# Patient Record
Sex: MALE | Race: WHITE | Employment: OTHER | URBAN - METROPOLITAN AREA
[De-identification: names, ages, dates, MRNs, and addresses within clinical notes are randomized per-mention and may not be internally consistent; named-entity substitution may affect disease eponyms.]

---

## 2017-01-04 ENCOUNTER — TELEPHONE (OUTPATIENT)
Dept: FAMILY MEDICINE CLINIC | Facility: CLINIC | Age: 72
End: 2017-01-04

## 2017-01-04 DIAGNOSIS — D49.2 SKIN GROWTH: Primary | ICD-10-CM

## 2017-01-04 RX ORDER — CODEINE PHOSPHATE AND GUAIFENESIN 10; 100 MG/5ML; MG/5ML
5 SOLUTION ORAL EVERY 6 HOURS PRN
Qty: 120 ML | Refills: 0 | OUTPATIENT
Start: 2017-01-04 | End: 2017-01-14

## 2017-01-04 NOTE — TELEPHONE ENCOUNTER
On 12/28/2015 he was given CHERATUSSIN AC. Was ill and took what he had left and it helps him feel better but he has run out and would like this refilled once to help clear up his cough.  Also wants referral to Dermatologist

## 2017-01-04 NOTE — TELEPHONE ENCOUNTER
Patient would like to know if he can get a refill on a cough syrup he was given last year.  Patient also stated he has a growth on arm would like to know if Dr. Angel Teran has any recommendations on a dermatologist.

## 2017-01-04 NOTE — TELEPHONE ENCOUNTER
[facility] [address] WHJYFUNETIONA [phone]   Called patient [facility] to call back

## 2017-01-09 RX ORDER — LISINOPRIL 20 MG/1
TABLET ORAL
Qty: 90 TABLET | Refills: 0 | Status: SHIPPED | OUTPATIENT
Start: 2017-01-09 | End: 2017-05-01

## 2017-01-09 NOTE — TELEPHONE ENCOUNTER
Incoming request for Lisinopril 20 mg. LOV 9/2/2016 and last labs 7/28/2016.  Future Appointments  Date Time Provider Avis Capri   1/9/2017 1:15 PM Antonio Melgar MD [facility]   4/12/2017 9:30 AM Yobani Jacome MD [address] [place] [address]

## 2017-02-06 ENCOUNTER — OFFICE VISIT (OUTPATIENT)
Dept: FAMILY MEDICINE CLINIC | Facility: CLINIC | Age: 72
End: 2017-02-06

## 2017-02-06 VITALS
SYSTOLIC BLOOD PRESSURE: 134 MMHG | TEMPERATURE: 98 F | WEIGHT: 173 LBS | HEART RATE: 68 BPM | RESPIRATION RATE: 16 BRPM | BODY MASS INDEX: 27 KG/M2 | DIASTOLIC BLOOD PRESSURE: 82 MMHG

## 2017-02-06 DIAGNOSIS — M65.4 DE QUERVAIN'S TENOSYNOVITIS, RIGHT: Primary | ICD-10-CM

## 2017-02-06 PROCEDURE — 99213 OFFICE O/P EST LOW 20 MIN: CPT | Performed by: FAMILY MEDICINE

## 2017-02-06 NOTE — PROGRESS NOTES
Patient presents with:  Wrist Pain: x3 months w/ right wrist pain and swelling. Pt states its getting worse now can't take cap of a bottle. Pain level 6-7/10.      HPI:   Wilver Stein is a 80year old male who presents to the office for R wrist and forear

## 2017-03-06 ENCOUNTER — TELEPHONE (OUTPATIENT)
Dept: FAMILY MEDICINE CLINIC | Facility: CLINIC | Age: 72
End: 2017-03-06

## 2017-03-06 NOTE — TELEPHONE ENCOUNTER
Reviewed Dr Ascencion Rodgers directives with pt and he verbalized understanding. He will take meds as usual today.

## 2017-03-06 NOTE — TELEPHONE ENCOUNTER
Pt states that he accidentally took his Lisinopril, Crestor and Plavix twice yesterday. He is feeling tired today. I asked pt to take his BP, but he said that he was too busy to take his BP. Pt questions if he should take his meds today or skip today? Ro

## 2017-03-06 NOTE — TELEPHONE ENCOUNTER
Pt states that yesterday he double dosed on 3 of his meds (Lisiniprol,Crestor and Plavix) Does he take meds today or skip? ?? Please advise

## 2017-03-06 NOTE — TELEPHONE ENCOUNTER
I would still take them as usual.   After exertion, its normal for BP to go up, so that does not surprise me.

## 2017-03-06 NOTE — TELEPHONE ENCOUNTER
Pt reports that BP was 154/55 after climbing the steps this morning. Five minutes later it was 122/64 and five minutes after that it was 116/64.

## 2017-03-29 PROBLEM — H25.13 NUCLEAR SCLEROTIC CATARACT OF BOTH EYES: Status: ACTIVE | Noted: 2017-03-29

## 2017-03-29 PROBLEM — H52.13 MYOPIA OF BOTH EYES: Status: ACTIVE | Noted: 2017-03-29

## 2017-04-10 ENCOUNTER — OFFICE VISIT (OUTPATIENT)
Dept: FAMILY MEDICINE CLINIC | Facility: CLINIC | Age: 72
End: 2017-04-10

## 2017-04-10 VITALS
HEART RATE: 68 BPM | TEMPERATURE: 98 F | SYSTOLIC BLOOD PRESSURE: 132 MMHG | DIASTOLIC BLOOD PRESSURE: 70 MMHG | WEIGHT: 174 LBS | RESPIRATION RATE: 12 BRPM | BODY MASS INDEX: 27 KG/M2

## 2017-04-10 DIAGNOSIS — J44.1 COPD EXACERBATION (HCC): Primary | ICD-10-CM

## 2017-04-10 PROCEDURE — 99213 OFFICE O/P EST LOW 20 MIN: CPT | Performed by: FAMILY MEDICINE

## 2017-04-10 RX ORDER — AZITHROMYCIN 250 MG/1
TABLET, FILM COATED ORAL
Qty: 6 TABLET | Refills: 0 | Status: SHIPPED | OUTPATIENT
Start: 2017-04-10 | End: 2017-04-21

## 2017-04-10 RX ORDER — CODEINE PHOSPHATE AND GUAIFENESIN 10; 100 MG/5ML; MG/5ML
5 SOLUTION ORAL EVERY 6 HOURS PRN
Qty: 120 ML | Refills: 0 | Status: SHIPPED | OUTPATIENT
Start: 2017-04-10 | End: 2017-04-20

## 2017-04-10 RX ORDER — PREDNISONE 20 MG/1
40 TABLET ORAL DAILY
Qty: 10 TABLET | Refills: 0 | Status: SHIPPED | OUTPATIENT
Start: 2017-04-10 | End: 2017-04-15

## 2017-04-10 NOTE — PROGRESS NOTES
Patient presents with:  Cough: x1 week w/ productive cough and sinus congestion. HPI:   Stephania Stokes is a 80year old male with PMH COPD who presents to the office for URI, cough. Niece got  on [place] cruise.   Several flights and cruises, erythematous  Neck - supple, no significant adenopathy  Chest - tight, wheezing heard throughout. Not air hungry    ASSESSMENT AND PLAN:     Anton Noris was seen in the office today:  had concerns including Cough.     1. COPD exacerbation ([address]  Rest  C

## 2017-04-21 ENCOUNTER — OFFICE VISIT (OUTPATIENT)
Dept: FAMILY MEDICINE CLINIC | Facility: CLINIC | Age: 72
End: 2017-04-21

## 2017-04-21 VITALS
HEART RATE: 64 BPM | RESPIRATION RATE: 14 BRPM | WEIGHT: 172 LBS | BODY MASS INDEX: 27 KG/M2 | TEMPERATURE: 98 F | SYSTOLIC BLOOD PRESSURE: 120 MMHG | DIASTOLIC BLOOD PRESSURE: 60 MMHG

## 2017-04-21 DIAGNOSIS — Z12.5 PROSTATE CANCER SCREENING: ICD-10-CM

## 2017-04-21 DIAGNOSIS — I10 ESSENTIAL HYPERTENSION, BENIGN: ICD-10-CM

## 2017-04-21 DIAGNOSIS — I73.9 PAD (PERIPHERAL ARTERY DISEASE) (HCC): ICD-10-CM

## 2017-04-21 DIAGNOSIS — E78.00 PURE HYPERCHOLESTEROLEMIA: ICD-10-CM

## 2017-04-21 DIAGNOSIS — E11.22 TYPE 2 DIABETES MELLITUS WITH STAGE 3 CHRONIC KIDNEY DISEASE, WITHOUT LONG-TERM CURRENT USE OF INSULIN (HCC): Primary | ICD-10-CM

## 2017-04-21 DIAGNOSIS — E11.29 MICROALBUMINURIA DUE TO TYPE 2 DIABETES MELLITUS (HCC): ICD-10-CM

## 2017-04-21 DIAGNOSIS — N18.30 TYPE 2 DIABETES MELLITUS WITH STAGE 3 CHRONIC KIDNEY DISEASE, WITHOUT LONG-TERM CURRENT USE OF INSULIN (HCC): Primary | ICD-10-CM

## 2017-04-21 DIAGNOSIS — J44.9 CHRONIC OBSTRUCTIVE PULMONARY DISEASE, UNSPECIFIED COPD TYPE (HCC): ICD-10-CM

## 2017-04-21 DIAGNOSIS — R80.9 MICROALBUMINURIA DUE TO TYPE 2 DIABETES MELLITUS (HCC): ICD-10-CM

## 2017-04-21 PROBLEM — H52.13 MYOPIA OF BOTH EYES: Status: RESOLVED | Noted: 2017-03-29 | Resolved: 2017-04-21

## 2017-04-21 PROCEDURE — 99214 OFFICE O/P EST MOD 30 MIN: CPT | Performed by: FAMILY MEDICINE

## 2017-04-21 RX ORDER — AZITHROMYCIN 250 MG/1
TABLET, FILM COATED ORAL
COMMUNITY
Start: 2017-04-10 | End: 2017-04-21 | Stop reason: ALTCHOICE

## 2017-04-21 RX ORDER — AZELASTINE 1 MG/ML
1 SPRAY, METERED NASAL 2 TIMES DAILY
Qty: 1 BOTTLE | Refills: 2 | Status: SHIPPED | OUTPATIENT
Start: 2017-04-21 | End: 2017-07-11

## 2017-04-21 RX ORDER — CODEINE PHOSPHATE AND GUAIFENESIN 10; 100 MG/5ML; MG/5ML
5 SOLUTION ORAL EVERY 6 HOURS PRN
Qty: 120 ML | Refills: 0 | Status: SHIPPED | OUTPATIENT
Start: 2017-04-21 | End: 2017-05-01

## 2017-04-21 RX ORDER — PREDNISONE 20 MG/1
TABLET ORAL
COMMUNITY
Start: 2017-04-10 | End: 2017-04-21 | Stop reason: ALTCHOICE

## 2017-04-21 NOTE — PROGRESS NOTES
Patient presents with:  Cough: follow up- finished Prednisone and Z-Ramiro last Saturday  Shortness Of Breath: follow up- still having SOB  Headache: still present but better     HPI:   Anton Hamm is a 80year old male with PMH DM2, HLD, HTN, COPD who pre week         Comment: 1 drink a month       REVIEW OF SYSTEMS:     Constitutional: positive for fatigue  Eyes: negative  Ears, nose, mouth, throat, and face: positive for nasal congestion  Respiratory: positive for cough  Cardiovascular: negative  Zondra Rathke HDL CHOL      <130 mg/dL 113   MALB URINE       1.27   CREATININE UR RANDOM       34.70   MALB/CRE CALC      <=30.0 ug/mg 36.6 (H)   HEMOGLOBIN A1c      <5.7 % 7.1 (H)   ESTIMATED AVERAGE GLUCOSE       mg/dL 157 (H)   PSA      0.010-4.000 ng/mL 1.330

## 2017-04-24 ENCOUNTER — HOSPITAL ENCOUNTER (OUTPATIENT)
Dept: GENERAL RADIOLOGY | Age: 72
Discharge: HOME | End: 2017-04-24
Attending: FAMILY MEDICINE
Payer: COMMERCIAL

## 2017-04-24 DIAGNOSIS — J44.9 CHRONIC OBSTRUCTIVE PULMONARY DISEASE, UNSPECIFIED COPD TYPE (HCC): ICD-10-CM

## 2017-04-24 PROCEDURE — 71020 XR CHEST PA + LAT CHEST (CPT=71020): CPT

## 2017-05-01 DIAGNOSIS — I10 ESSENTIAL HYPERTENSION, BENIGN: Primary | ICD-10-CM

## 2017-05-03 RX ORDER — LISINOPRIL 20 MG/1
TABLET ORAL
Qty: 90 TABLET | Refills: 1 | Status: SHIPPED | OUTPATIENT
Start: 2017-05-03 | End: 2017-05-15

## 2017-05-03 NOTE — TELEPHONE ENCOUNTER
Medication refilled per protocol.     LOV 4/21/2017  Future Appointments  Date Time Provider Avis Farooq   6/27/2017 10:10 AM Grace Grayson MD [facility]       Pending Prescriptions Disp Refills    LISINOPRIL 20 MG Oral Tab [facility] Med Name

## 2017-05-15 ENCOUNTER — TELEPHONE (OUTPATIENT)
Dept: FAMILY MEDICINE CLINIC | Facility: CLINIC | Age: 72
End: 2017-05-15

## 2017-05-15 RX ORDER — ENALAPRIL MALEATE 10 MG/1
10 TABLET ORAL DAILY
Qty: 30 TABLET | Refills: 5 | Status: SHIPPED | OUTPATIENT
Start: 2017-05-15 | End: 2017-07-31

## 2017-05-15 NOTE — TELEPHONE ENCOUNTER
With Mr. Gomez's allergies to several other meds, I would be hesitant to change a med that is not causing any issues otherwise like this. Perhaps he can look for the same medication at a different pharmacy?   Sometimes different groups contract to get thei

## 2017-05-15 NOTE — TELEPHONE ENCOUNTER
patient refuses to take lisinopril as it is all made in [facility] now he wants a different medication then he will do the research to see if it is safe   ACE inhibitors  Generic name Common brand names benazepril hydrochloride Lotensin* captopril Capoten* enal

## 2017-05-15 NOTE — TELEPHONE ENCOUNTER
Patient is requesting a new medication to take place of Lisinopril as all the lisinopril that the pharmacy wants to give him is made in [facility] and he absolutely refuses to take any [facility] medication.  He would like the name of something new so he can researc

## 2017-05-15 NOTE — TELEPHONE ENCOUNTER
Spoke with pt and he stated that he will do some research on Enalapril before he takes it. I did inform pt that Dr. Patten Getting sent a prescription over to his pharmacy.  Pt states that he will call back to let our office know if he decides to pick the prescript

## 2017-05-15 NOTE — TELEPHONE ENCOUNTER
Will switch to enalapril  Start with 10mg.   Can increase to 20  Given monthly supply initially to ensure he tolerates it/

## 2017-06-04 RX ORDER — TAMSULOSIN HYDROCHLORIDE 0.4 MG/1
CAPSULE ORAL
Qty: 90 CAPSULE | Refills: 1 | Status: SHIPPED | OUTPATIENT
Start: 2017-06-04 | End: 2017-10-28

## 2017-07-07 ENCOUNTER — PRIOR ORIGINAL RECORDS (OUTPATIENT)
Dept: OTHER | Age: 72
End: 2017-07-07

## 2017-07-17 NOTE — IMAGING NOTE
Pre procedure instruction given. Pt to be here in the [facility] at [place] [address] for lab. NPO for 6 hrs, will have a  to drive back home after the procedure. Plavix held for 7 days and Takes half a tab of the 325 Asprin.  Last dose was 7/16/17 per his

## 2017-07-18 ENCOUNTER — APPOINTMENT (OUTPATIENT)
Dept: LAB | Facility: HOSPITAL | Age: 72
End: 2017-07-18
Attending: INTERNAL MEDICINE
Payer: COMMERCIAL

## 2017-07-18 ENCOUNTER — HOSPITAL ENCOUNTER (OUTPATIENT)
Dept: CT IMAGING | Facility: HOSPITAL | Age: 72
Discharge: HOME | End: 2017-07-18
Attending: INTERNAL MEDICINE
Payer: COMMERCIAL

## 2017-07-18 ENCOUNTER — HOSPITAL ENCOUNTER (OUTPATIENT)
Dept: GENERAL RADIOLOGY | Facility: HOSPITAL | Age: 72
Discharge: HOME | End: 2017-07-18
Attending: RADIOLOGY
Payer: COMMERCIAL

## 2017-07-18 VITALS
HEIGHT: 67 IN | BODY MASS INDEX: 26.68 KG/M2 | WEIGHT: 170 LBS | OXYGEN SATURATION: 98 % | DIASTOLIC BLOOD PRESSURE: 65 MMHG | RESPIRATION RATE: 18 BRPM | SYSTOLIC BLOOD PRESSURE: 150 MMHG | HEART RATE: 48 BPM | TEMPERATURE: 98 F

## 2017-07-18 DIAGNOSIS — R91.8 LUNG MASS: ICD-10-CM

## 2017-07-18 DIAGNOSIS — R91.8 LUNG MASS: Primary | ICD-10-CM

## 2017-07-18 LAB
ERYTHROCYTE [DISTWIDTH] IN BLOOD BY AUTOMATED COUNT: 13.1 % (ref 11.5–16)
GLUCOSE BLD-MCNC: 140 MG/DL (ref 65–99)
HCT VFR BLD AUTO: 37.2 % (ref 37–53)
HGB BLD-MCNC: 12.5 G/DL (ref 13–17)
INR BLD: 1.02 (ref 0.89–1.11)
MCH RBC QN AUTO: 32.5 PG (ref 27–33.2)
MCHC RBC AUTO-ENTMCNC: 33.6 G/DL (ref 31–37)
MCV RBC AUTO: 96.6 FL (ref 80–99)
PLATELET # BLD AUTO: 237 10(3)UL (ref 150–450)
PSA SERPL DL<=0.01 NG/ML-MCNC: 13.4 SECONDS (ref 12–14.3)
RBC # BLD AUTO: 3.85 X10(6)UL (ref 3.8–5.8)
RED CELL DISTRIBUTION WIDTH-SD: 46.2 FL (ref 35.1–46.3)
WBC # BLD AUTO: 6.9 X10(3) UL (ref 4–13)

## 2017-07-18 PROCEDURE — 85610 PROTHROMBIN TIME: CPT

## 2017-07-18 PROCEDURE — 82962 GLUCOSE BLOOD TEST: CPT

## 2017-07-18 PROCEDURE — 77012 CT SCAN FOR NEEDLE BIOPSY: CPT | Performed by: INTERNAL MEDICINE

## 2017-07-18 PROCEDURE — 32405 CT BIOPSY LUNG OR MEDIASTINUM (CPT=77012/32405): CPT | Performed by: INTERNAL MEDICINE

## 2017-07-18 PROCEDURE — 99152 MOD SED SAME PHYS/QHP 5/>YRS: CPT | Performed by: INTERNAL MEDICINE

## 2017-07-18 PROCEDURE — 88305 TISSUE EXAM BY PATHOLOGIST: CPT | Performed by: INTERNAL MEDICINE

## 2017-07-18 PROCEDURE — 71010 XR CHEST AP PORTABLE  (CPT=71010): CPT | Performed by: RADIOLOGY

## 2017-07-18 PROCEDURE — 85027 COMPLETE CBC AUTOMATED: CPT | Performed by: RADIOLOGY

## 2017-07-18 PROCEDURE — 36415 COLL VENOUS BLD VENIPUNCTURE: CPT

## 2017-07-18 RX ORDER — NALOXONE HYDROCHLORIDE 0.4 MG/ML
80 INJECTION, SOLUTION INTRAMUSCULAR; INTRAVENOUS; SUBCUTANEOUS AS NEEDED
Status: DISCONTINUED | OUTPATIENT
Start: 2017-07-18 | End: 2017-07-22

## 2017-07-18 RX ORDER — MIDAZOLAM HYDROCHLORIDE 1 MG/ML
1 INJECTION INTRAMUSCULAR; INTRAVENOUS EVERY 5 MIN PRN
Status: ACTIVE | OUTPATIENT
Start: 2017-07-18 | End: 2017-07-18

## 2017-07-18 RX ORDER — MIDAZOLAM HYDROCHLORIDE 1 MG/ML
INJECTION INTRAMUSCULAR; INTRAVENOUS
Status: COMPLETED
Start: 2017-07-18 | End: 2017-07-18

## 2017-07-18 RX ORDER — FLUMAZENIL 0.1 MG/ML
0.2 INJECTION, SOLUTION INTRAVENOUS AS NEEDED
Status: DISCONTINUED | OUTPATIENT
Start: 2017-07-18 | End: 2017-07-22

## 2017-07-18 RX ORDER — SODIUM CHLORIDE 9 MG/ML
INJECTION, SOLUTION INTRAVENOUS CONTINUOUS
Status: DISCONTINUED | OUTPATIENT
Start: 2017-07-18 | End: 2017-07-22

## 2017-07-18 RX ORDER — MIDAZOLAM HYDROCHLORIDE 1 MG/ML
INJECTION INTRAMUSCULAR; INTRAVENOUS
Status: DISCONTINUED
Start: 2017-07-18 | End: 2017-07-18 | Stop reason: WASHOUT

## 2017-07-18 RX ADMIN — MIDAZOLAM HYDROCHLORIDE 1 MG: 1 INJECTION INTRAMUSCULAR; INTRAVENOUS at 09:18:00

## 2017-07-18 RX ADMIN — MIDAZOLAM HYDROCHLORIDE 1 MG: 1 INJECTION INTRAMUSCULAR; INTRAVENOUS at 09:23:00

## 2017-07-18 RX ADMIN — SODIUM CHLORIDE: 9 INJECTION, SOLUTION INTRAVENOUS at 09:15:00

## 2017-07-18 NOTE — IMAGING NOTE
Pt tolerated procedure well, vss on RA, presently denies pain and dressing is CDI. Pt and family updated on procedure and plan of care, report called to [facility] RN and awaiting transport to room#47 with family at bedside.   Pt may resume Plavix and ASA

## 2017-07-24 ENCOUNTER — PATIENT OUTREACH (OUTPATIENT)
Dept: CASE MANAGEMENT | Age: 72
End: 2017-07-24

## 2017-07-28 ENCOUNTER — OFFICE VISIT (OUTPATIENT)
Dept: FAMILY MEDICINE CLINIC | Facility: CLINIC | Age: 72
End: 2017-07-28

## 2017-07-28 ENCOUNTER — APPOINTMENT (OUTPATIENT)
Dept: LAB | Age: 72
End: 2017-07-28
Attending: FAMILY MEDICINE
Payer: COMMERCIAL

## 2017-07-28 VITALS
WEIGHT: 169 LBS | DIASTOLIC BLOOD PRESSURE: 70 MMHG | TEMPERATURE: 98 F | SYSTOLIC BLOOD PRESSURE: 126 MMHG | HEIGHT: 66 IN | BODY MASS INDEX: 27.16 KG/M2 | HEART RATE: 84 BPM

## 2017-07-28 DIAGNOSIS — I10 ESSENTIAL HYPERTENSION, BENIGN: ICD-10-CM

## 2017-07-28 DIAGNOSIS — E11.29 MICROALBUMINURIA DUE TO TYPE 2 DIABETES MELLITUS (HCC): ICD-10-CM

## 2017-07-28 DIAGNOSIS — Z00.00 ANNUAL PHYSICAL EXAM: Primary | ICD-10-CM

## 2017-07-28 DIAGNOSIS — R80.9 MICROALBUMINURIA DUE TO TYPE 2 DIABETES MELLITUS (HCC): ICD-10-CM

## 2017-07-28 DIAGNOSIS — N18.30 TYPE 2 DIABETES MELLITUS WITH STAGE 3 CHRONIC KIDNEY DISEASE, WITHOUT LONG-TERM CURRENT USE OF INSULIN (HCC): ICD-10-CM

## 2017-07-28 DIAGNOSIS — E78.00 PURE HYPERCHOLESTEROLEMIA: ICD-10-CM

## 2017-07-28 DIAGNOSIS — Z12.5 PROSTATE CANCER SCREENING: ICD-10-CM

## 2017-07-28 DIAGNOSIS — E11.22 TYPE 2 DIABETES MELLITUS WITH STAGE 3 CHRONIC KIDNEY DISEASE, WITHOUT LONG-TERM CURRENT USE OF INSULIN (HCC): ICD-10-CM

## 2017-07-28 DIAGNOSIS — J44.9 CHRONIC OBSTRUCTIVE PULMONARY DISEASE, UNSPECIFIED COPD TYPE (HCC): ICD-10-CM

## 2017-07-28 DIAGNOSIS — I73.9 PAD (PERIPHERAL ARTERY DISEASE) (HCC): ICD-10-CM

## 2017-07-28 LAB
ALBUMIN SERPL-MCNC: 3.7 G/DL (ref 3.5–4.8)
ALP LIVER SERPL-CCNC: 84 U/L (ref 45–117)
ALT SERPL-CCNC: 32 U/L (ref 17–63)
AST SERPL-CCNC: 19 U/L (ref 15–41)
BILIRUB SERPL-MCNC: 0.4 MG/DL (ref 0.1–2)
BUN BLD-MCNC: 27 MG/DL (ref 8–20)
CALCIUM BLD-MCNC: 8.7 MG/DL (ref 8.3–10.3)
CHLORIDE: 108 MMOL/L (ref 101–111)
CHOLEST SMN-MCNC: 140 MG/DL (ref ?–200)
CO2: 22 MMOL/L (ref 22–32)
COMPLEXED PSA SERPL-MCNC: 1.31 NG/ML (ref 0.01–4)
CREAT BLD-MCNC: 1.27 MG/DL (ref 0.7–1.3)
CREAT UR-SCNC: 26 MG/DL
EST. AVERAGE GLUCOSE BLD GHB EST-MCNC: 143 MG/DL (ref 68–126)
GLUCOSE BLD-MCNC: 138 MG/DL (ref 70–99)
HBA1C MFR BLD HPLC: 6.6 % (ref ?–5.7)
HDLC SERPL-MCNC: 50 MG/DL (ref 45–?)
HDLC SERPL: 2.8 {RATIO} (ref ?–4.97)
LDLC SERPL CALC-MCNC: 77 MG/DL (ref ?–130)
LDLC SERPL-MCNC: 13 MG/DL (ref 5–40)
M PROTEIN MFR SERPL ELPH: 7.2 G/DL (ref 6.1–8.3)
MICROALBUMIN UR-MCNC: 3.66 MG/DL
MICROALBUMIN/CREAT 24H UR-RTO: 140.8 UG/MG (ref ?–30)
NONHDLC SERPL-MCNC: 90 MG/DL (ref ?–130)
POTASSIUM SERPL-SCNC: 5 MMOL/L (ref 3.6–5.1)
SODIUM SERPL-SCNC: 138 MMOL/L (ref 136–144)
TRIGLYCERIDES: 64 MG/DL (ref ?–150)

## 2017-07-28 PROCEDURE — 99397 PER PM REEVAL EST PAT 65+ YR: CPT | Performed by: FAMILY MEDICINE

## 2017-07-28 PROCEDURE — 83036 HEMOGLOBIN GLYCOSYLATED A1C: CPT

## 2017-07-28 PROCEDURE — 82570 ASSAY OF URINE CREATININE: CPT

## 2017-07-28 PROCEDURE — 80061 LIPID PANEL: CPT

## 2017-07-28 PROCEDURE — 36415 COLL VENOUS BLD VENIPUNCTURE: CPT

## 2017-07-28 PROCEDURE — 82043 UR ALBUMIN QUANTITATIVE: CPT

## 2017-07-28 PROCEDURE — 80053 COMPREHEN METABOLIC PANEL: CPT

## 2017-07-28 NOTE — PROGRESS NOTES
Patient presents with:  Physical: annual physical, labs drawn     HPI:   Doug Soliz is a 80year old male who presents for a complete physical exam.     Last colonoscopy:  Dr. Marcia Whiteside. No repeat needed until 80yo  Done 2016.    Last PSA:  Last normal in Acids (FISH OIL OR) Take by mouth. Disp:  Rfl:    Glucosamine-Chondroit-Vit C-Mn (GLUCOSAMINE 1500 COMPLEX OR) Take by mouth. Disp:  Rfl:    Ergocalciferol (VITAMIN D OR) Take by mouth. Disp:  Rfl:    Coenzyme Q10 (CO Q 10 OR) Take by mouth.  Disp:  Rfl: Past Surgical History:  No date: CAROTID ENDARTERECTOMY Left  2015: CAROTID ENDARTERECTOMY  No date: CATH BARE METAL STENT (BMS)      Comment: left *carotid* stent 02/16 2006: COLONOSCOPY  10/2016: COLONOSCOPY      Comment: pt no longer needs to get col regular rate and rhythm  Abdomen: soft, non-tender; bowel sounds normal; no masses,  no organomegaly  Extremities: Bilateral barefoot skin diabetic exam is normal, visualized feet and the appearance is normal.  Bilateral monofilament/sensation of both feet

## 2017-07-31 DIAGNOSIS — I10 ESSENTIAL HYPERTENSION, BENIGN: Primary | ICD-10-CM

## 2017-08-01 DIAGNOSIS — E78.00 PURE HYPERCHOLESTEROLEMIA: ICD-10-CM

## 2017-08-01 RX ORDER — ENALAPRIL MALEATE 10 MG/1
10 TABLET ORAL DAILY
Qty: 30 TABLET | Refills: 5 | Status: SHIPPED | OUTPATIENT
Start: 2017-08-01 | End: 2018-03-07

## 2017-08-02 RX ORDER — ROSUVASTATIN CALCIUM 20 MG/1
20 TABLET, COATED ORAL NIGHTLY
Qty: 90 TABLET | Refills: 1 | Status: SHIPPED | OUTPATIENT
Start: 2017-08-02 | End: 2018-06-04

## 2017-08-02 NOTE — TELEPHONE ENCOUNTER
Gerald Zarco is requesting a refill of Rosuvastatin 20mg Si tablet nightly. LOV for physical 17. Last labs were done at that visit.   Medication #90 with 1 additional refill given per protocol and 's order sent to [facility].

## 2017-10-30 RX ORDER — TAMSULOSIN HYDROCHLORIDE 0.4 MG/1
CAPSULE ORAL
Qty: 90 CAPSULE | Refills: 1 | Status: SHIPPED | OUTPATIENT
Start: 2017-10-30 | End: 2018-06-04

## 2017-11-27 ENCOUNTER — OFFICE VISIT (OUTPATIENT)
Dept: FAMILY MEDICINE CLINIC | Facility: CLINIC | Age: 72
End: 2017-11-27

## 2017-11-27 VITALS
HEART RATE: 72 BPM | WEIGHT: 170 LBS | RESPIRATION RATE: 16 BRPM | TEMPERATURE: 102 F | DIASTOLIC BLOOD PRESSURE: 70 MMHG | BODY MASS INDEX: 27 KG/M2 | SYSTOLIC BLOOD PRESSURE: 120 MMHG

## 2017-11-27 DIAGNOSIS — J01.10 ACUTE NON-RECURRENT FRONTAL SINUSITIS: Primary | ICD-10-CM

## 2017-11-27 PROCEDURE — 99213 OFFICE O/P EST LOW 20 MIN: CPT | Performed by: FAMILY MEDICINE

## 2017-11-27 RX ORDER — LEVOFLOXACIN 750 MG/1
750 TABLET ORAL DAILY
Qty: 7 TABLET | Refills: 0 | Status: SHIPPED | OUTPATIENT
Start: 2017-11-27 | End: 2017-12-04

## 2017-11-27 RX ORDER — CODEINE PHOSPHATE AND GUAIFENESIN 10; 100 MG/5ML; MG/5ML
5 SOLUTION ORAL EVERY 6 HOURS PRN
Qty: 240 ML | Refills: 0 | Status: SHIPPED | OUTPATIENT
Start: 2017-11-27 | End: 2017-12-07

## 2017-11-27 NOTE — PROGRESS NOTES
Patient presents with:  Cold: getting worse, coughing, bringing up green mucous     HPI:   Anton Hamm is a 71year old male who presents to the office for cough, fever. Fever 102 today. Symptoms since Tuesday (6 days). Feeling a lot of fatigue.   Has

## 2018-01-01 ENCOUNTER — OFFICE VISIT (OUTPATIENT)
Dept: HEMATOLOGY/ONCOLOGY | Facility: HOSPITAL | Age: 73
End: 2018-01-01
Attending: INTERNAL MEDICINE
Payer: COMMERCIAL

## 2018-01-01 ENCOUNTER — HOSPITAL ENCOUNTER (OUTPATIENT)
Dept: RADIATION ONCOLOGY | Facility: HOSPITAL | Age: 73
Discharge: HOME | End: 2018-01-01
Attending: RADIOLOGY
Payer: COMMERCIAL

## 2018-01-01 ENCOUNTER — TELEPHONE (OUTPATIENT)
Dept: HEMATOLOGY/ONCOLOGY | Facility: HOSPITAL | Age: 73
End: 2018-01-01

## 2018-01-01 ENCOUNTER — OFFICE VISIT (OUTPATIENT)
Dept: FAMILY MEDICINE CLINIC | Facility: CLINIC | Age: 73
End: 2018-01-01
Payer: COMMERCIAL

## 2018-01-01 ENCOUNTER — APPOINTMENT (OUTPATIENT)
Dept: RADIATION ONCOLOGY | Age: 73
End: 2018-01-01
Attending: RADIOLOGY
Payer: COMMERCIAL

## 2018-01-01 ENCOUNTER — HOSPITAL ENCOUNTER (OUTPATIENT)
Dept: ULTRASOUND IMAGING | Age: 73
Discharge: HOME | End: 2018-01-01
Attending: CLINICAL NURSE SPECIALIST
Payer: COMMERCIAL

## 2018-01-01 ENCOUNTER — HOSPITAL ENCOUNTER (OUTPATIENT)
Dept: RADIATION ONCOLOGY | Facility: HOSPITAL | Age: 73
End: 2018-01-01
Attending: RADIOLOGY
Payer: COMMERCIAL

## 2018-01-01 ENCOUNTER — TELEPHONE (OUTPATIENT)
Dept: FAMILY MEDICINE CLINIC | Facility: CLINIC | Age: 73
End: 2018-01-01

## 2018-01-01 ENCOUNTER — HOSPITAL ENCOUNTER (OUTPATIENT)
Dept: NUCLEAR MEDICINE | Facility: HOSPITAL | Age: 73
Discharge: HOME | End: 2018-01-01
Attending: INTERNAL MEDICINE
Payer: COMMERCIAL

## 2018-01-01 ENCOUNTER — HOSPITAL ENCOUNTER (OUTPATIENT)
Dept: RADIATION ONCOLOGY | Age: 73
Discharge: HOME | End: 2018-01-01
Attending: RADIOLOGY
Payer: COMMERCIAL

## 2018-01-01 ENCOUNTER — HOSPITAL ENCOUNTER (OUTPATIENT)
Dept: ULTRASOUND IMAGING | Age: 73
Discharge: HOME | End: 2018-01-01
Attending: INTERNAL MEDICINE
Payer: COMMERCIAL

## 2018-01-01 ENCOUNTER — HOSPITAL ENCOUNTER (OUTPATIENT)
Dept: ULTRASOUND IMAGING | Facility: HOSPITAL | Age: 73
Discharge: HOME | End: 2018-01-01
Attending: INTERNAL MEDICINE
Payer: COMMERCIAL

## 2018-01-01 VITALS
BODY MASS INDEX: 26.6 KG/M2 | SYSTOLIC BLOOD PRESSURE: 114 MMHG | HEIGHT: 67.01 IN | OXYGEN SATURATION: 96 % | DIASTOLIC BLOOD PRESSURE: 67 MMHG | WEIGHT: 169.5 LBS | TEMPERATURE: 99 F | RESPIRATION RATE: 18 BRPM | HEART RATE: 106 BPM

## 2018-01-01 VITALS
OXYGEN SATURATION: 97 % | DIASTOLIC BLOOD PRESSURE: 76 MMHG | SYSTOLIC BLOOD PRESSURE: 165 MMHG | TEMPERATURE: 99 F | HEART RATE: 100 BPM | RESPIRATION RATE: 18 BRPM

## 2018-01-01 VITALS
HEIGHT: 67.01 IN | OXYGEN SATURATION: 96 % | DIASTOLIC BLOOD PRESSURE: 67 MMHG | SYSTOLIC BLOOD PRESSURE: 121 MMHG | HEART RATE: 109 BPM | WEIGHT: 171.5 LBS | RESPIRATION RATE: 18 BRPM | BODY MASS INDEX: 26.92 KG/M2 | TEMPERATURE: 99 F

## 2018-01-01 VITALS
BODY MASS INDEX: 27.55 KG/M2 | DIASTOLIC BLOOD PRESSURE: 78 MMHG | HEIGHT: 67.01 IN | WEIGHT: 175.5 LBS | HEART RATE: 81 BPM | OXYGEN SATURATION: 95 % | TEMPERATURE: 99 F | SYSTOLIC BLOOD PRESSURE: 149 MMHG | RESPIRATION RATE: 16 BRPM

## 2018-01-01 VITALS
RESPIRATION RATE: 20 BRPM | WEIGHT: 178.19 LBS | TEMPERATURE: 99 F | HEART RATE: 76 BPM | OXYGEN SATURATION: 98 % | BODY MASS INDEX: 28 KG/M2 | DIASTOLIC BLOOD PRESSURE: 90 MMHG | SYSTOLIC BLOOD PRESSURE: 190 MMHG

## 2018-01-01 VITALS
HEIGHT: 67.01 IN | RESPIRATION RATE: 20 BRPM | TEMPERATURE: 98 F | OXYGEN SATURATION: 97 % | HEART RATE: 74 BPM | WEIGHT: 168.5 LBS | BODY MASS INDEX: 26.45 KG/M2 | SYSTOLIC BLOOD PRESSURE: 136 MMHG | DIASTOLIC BLOOD PRESSURE: 73 MMHG

## 2018-01-01 VITALS
TEMPERATURE: 99 F | RESPIRATION RATE: 20 BRPM | HEART RATE: 91 BPM | WEIGHT: 169 LBS | BODY MASS INDEX: 26.53 KG/M2 | OXYGEN SATURATION: 96 % | DIASTOLIC BLOOD PRESSURE: 74 MMHG | SYSTOLIC BLOOD PRESSURE: 128 MMHG | HEIGHT: 67.01 IN

## 2018-01-01 VITALS
BODY MASS INDEX: 26.37 KG/M2 | SYSTOLIC BLOOD PRESSURE: 110 MMHG | TEMPERATURE: 99 F | HEIGHT: 67 IN | WEIGHT: 168 LBS | HEART RATE: 72 BPM | RESPIRATION RATE: 16 BRPM | DIASTOLIC BLOOD PRESSURE: 70 MMHG

## 2018-01-01 VITALS
WEIGHT: 170 LBS | RESPIRATION RATE: 18 BRPM | BODY MASS INDEX: 27 KG/M2 | SYSTOLIC BLOOD PRESSURE: 121 MMHG | DIASTOLIC BLOOD PRESSURE: 71 MMHG | OXYGEN SATURATION: 98 % | HEART RATE: 85 BPM

## 2018-01-01 VITALS
DIASTOLIC BLOOD PRESSURE: 78 MMHG | WEIGHT: 176.5 LBS | SYSTOLIC BLOOD PRESSURE: 190 MMHG | BODY MASS INDEX: 28 KG/M2 | HEART RATE: 65 BPM

## 2018-01-01 VITALS
WEIGHT: 170.5 LBS | HEART RATE: 80 BPM | BODY MASS INDEX: 26.76 KG/M2 | RESPIRATION RATE: 16 BRPM | TEMPERATURE: 98 F | SYSTOLIC BLOOD PRESSURE: 110 MMHG | HEIGHT: 67 IN | DIASTOLIC BLOOD PRESSURE: 60 MMHG

## 2018-01-01 VITALS — DIASTOLIC BLOOD PRESSURE: 85 MMHG | SYSTOLIC BLOOD PRESSURE: 152 MMHG | HEART RATE: 99 BPM

## 2018-01-01 VITALS — TEMPERATURE: 98 F | SYSTOLIC BLOOD PRESSURE: 109 MMHG | HEART RATE: 79 BPM | DIASTOLIC BLOOD PRESSURE: 59 MMHG

## 2018-01-01 VITALS
SYSTOLIC BLOOD PRESSURE: 142 MMHG | RESPIRATION RATE: 17 BRPM | WEIGHT: 169 LBS | HEART RATE: 75 BPM | OXYGEN SATURATION: 98 % | BODY MASS INDEX: 26 KG/M2 | DIASTOLIC BLOOD PRESSURE: 76 MMHG

## 2018-01-01 VITALS
BODY MASS INDEX: 27 KG/M2 | WEIGHT: 173.63 LBS | SYSTOLIC BLOOD PRESSURE: 155 MMHG | HEART RATE: 88 BPM | DIASTOLIC BLOOD PRESSURE: 87 MMHG | TEMPERATURE: 99 F | OXYGEN SATURATION: 93 %

## 2018-01-01 VITALS
TEMPERATURE: 99 F | HEART RATE: 106 BPM | OXYGEN SATURATION: 95 % | DIASTOLIC BLOOD PRESSURE: 77 MMHG | SYSTOLIC BLOOD PRESSURE: 144 MMHG | RESPIRATION RATE: 20 BRPM

## 2018-01-01 DIAGNOSIS — C34.91 ADENOCARCINOMA OF LUNG, STAGE 4, RIGHT (HCC): ICD-10-CM

## 2018-01-01 DIAGNOSIS — R42 VERTIGO: Primary | ICD-10-CM

## 2018-01-01 DIAGNOSIS — C34.31 MALIGNANT NEOPLASM OF LOWER LOBE OF RIGHT LUNG (HCC): Primary | ICD-10-CM

## 2018-01-01 DIAGNOSIS — C78.7 LIVER METASTASIS (HCC): ICD-10-CM

## 2018-01-01 DIAGNOSIS — C34.01 MALIGNANT NEOPLASM OF HILUS OF RIGHT LUNG (HCC): Primary | ICD-10-CM

## 2018-01-01 DIAGNOSIS — I80.9 PHLEBITIS AND THROMBOPHLEBITIS OF UNSPECIFIED SITE: Primary | ICD-10-CM

## 2018-01-01 DIAGNOSIS — C34.01 MALIGNANT NEOPLASM OF HILUS OF RIGHT LUNG (HCC): ICD-10-CM

## 2018-01-01 DIAGNOSIS — I80.8 SUPERFICIAL THROMBOPHLEBITIS OF LEFT UPPER EXTREMITY: ICD-10-CM

## 2018-01-01 DIAGNOSIS — D64.81 ANEMIA ASSOCIATED WITH CHEMOTHERAPY: ICD-10-CM

## 2018-01-01 DIAGNOSIS — R59.0 MEDIASTINAL ADENOPATHY: ICD-10-CM

## 2018-01-01 DIAGNOSIS — E78.00 PURE HYPERCHOLESTEROLEMIA: ICD-10-CM

## 2018-01-01 DIAGNOSIS — R60.1 GENERALIZED EDEMA: Primary | ICD-10-CM

## 2018-01-01 DIAGNOSIS — R42 VERTIGO: ICD-10-CM

## 2018-01-01 DIAGNOSIS — C34.91 BRONCHOGENIC CANCER OF RIGHT LUNG (HCC): Primary | ICD-10-CM

## 2018-01-01 DIAGNOSIS — T45.1X5A ANEMIA ASSOCIATED WITH CHEMOTHERAPY: ICD-10-CM

## 2018-01-01 DIAGNOSIS — I80.9 PHLEBITIS AND THROMBOPHLEBITIS OF UNSPECIFIED SITE: ICD-10-CM

## 2018-01-01 DIAGNOSIS — I80.8 SUPERFICIAL THROMBOPHLEBITIS OF LEFT UPPER EXTREMITY: Primary | ICD-10-CM

## 2018-01-01 DIAGNOSIS — R05.9 COUGH: ICD-10-CM

## 2018-01-01 DIAGNOSIS — C34.91 BRONCHOGENIC CANCER OF RIGHT LUNG (HCC): ICD-10-CM

## 2018-01-01 DIAGNOSIS — M79.632 PAIN AND SWELLING OF LEFT FOREARM: ICD-10-CM

## 2018-01-01 DIAGNOSIS — C34.92 BRONCHOGENIC LUNG CANCER, LEFT (HCC): Primary | ICD-10-CM

## 2018-01-01 DIAGNOSIS — I80.9 PHLEBITIS: ICD-10-CM

## 2018-01-01 DIAGNOSIS — R60.1 GENERALIZED EDEMA: ICD-10-CM

## 2018-01-01 DIAGNOSIS — R05.8 PRODUCTIVE COUGH: ICD-10-CM

## 2018-01-01 DIAGNOSIS — M79.89 PAIN AND SWELLING OF LEFT FOREARM: ICD-10-CM

## 2018-01-01 DIAGNOSIS — I10 ESSENTIAL HYPERTENSION, BENIGN: ICD-10-CM

## 2018-01-01 DIAGNOSIS — J44.9 CHRONIC OBSTRUCTIVE PULMONARY DISEASE, UNSPECIFIED COPD TYPE (HCC): ICD-10-CM

## 2018-01-01 DIAGNOSIS — I80.9 PHLEBITIS: Primary | ICD-10-CM

## 2018-01-01 DIAGNOSIS — K76.9 LIVER LESION: ICD-10-CM

## 2018-01-01 DIAGNOSIS — R05.9 COUGH: Primary | ICD-10-CM

## 2018-01-01 LAB
ALBUMIN SERPL-MCNC: 2.9 G/DL (ref 3.5–4.8)
ALBUMIN SERPL-MCNC: 3.2 G/DL (ref 3.5–4.8)
ALBUMIN SERPL-MCNC: 3.3 G/DL (ref 3.5–4.8)
ALBUMIN/GLOB SERPL: 0.8 {RATIO} (ref 1–2)
ALBUMIN/GLOB SERPL: 0.9 {RATIO} (ref 1–2)
ALBUMIN/GLOB SERPL: 1 {RATIO} (ref 1–2)
ALP LIVER SERPL-CCNC: 63 U/L (ref 45–117)
ALP LIVER SERPL-CCNC: 70 U/L (ref 45–117)
ALP LIVER SERPL-CCNC: 72 U/L (ref 45–117)
ALT SERPL-CCNC: 18 U/L (ref 17–63)
ALT SERPL-CCNC: 21 U/L (ref 17–63)
ALT SERPL-CCNC: 22 U/L (ref 17–63)
ANION GAP SERPL CALC-SCNC: 10 MMOL/L (ref 0–18)
ANION GAP SERPL CALC-SCNC: 7 MMOL/L (ref 0–18)
ANION GAP SERPL CALC-SCNC: 8 MMOL/L (ref 0–18)
ANION GAP SERPL CALC-SCNC: 8 MMOL/L (ref 0–18)
AST SERPL-CCNC: 13 U/L (ref 15–41)
AST SERPL-CCNC: 14 U/L (ref 15–41)
AST SERPL-CCNC: 16 U/L (ref 15–41)
BASOPHILS # BLD AUTO: 0.02 X10(3) UL (ref 0–0.1)
BASOPHILS # BLD AUTO: 0.03 X10(3) UL (ref 0–0.1)
BASOPHILS NFR BLD AUTO: 0.3 %
BASOPHILS NFR BLD AUTO: 1.1 %
BILIRUB SERPL-MCNC: 0.2 MG/DL (ref 0.1–2)
BILIRUB SERPL-MCNC: 0.2 MG/DL (ref 0.1–2)
BILIRUB SERPL-MCNC: 0.5 MG/DL (ref 0.1–2)
BUN BLD-MCNC: 25 MG/DL (ref 8–20)
BUN BLD-MCNC: 27 MG/DL (ref 8–20)
BUN BLD-MCNC: 35 MG/DL (ref 8–20)
BUN BLD-MCNC: 57 MG/DL (ref 8–20)
BUN/CREAT SERPL: 20.2 (ref 10–20)
BUN/CREAT SERPL: 20.5 (ref 10–20)
BUN/CREAT SERPL: 23.2 (ref 10–20)
BUN/CREAT SERPL: 32.8 (ref 10–20)
CALCIUM BLD-MCNC: 8.4 MG/DL (ref 8.3–10.3)
CALCIUM BLD-MCNC: 8.5 MG/DL (ref 8.3–10.3)
CALCIUM BLD-MCNC: 8.5 MG/DL (ref 8.3–10.3)
CALCIUM BLD-MCNC: 8.6 MG/DL (ref 8.3–10.3)
CHLORIDE SERPL-SCNC: 106 MMOL/L (ref 101–111)
CHLORIDE SERPL-SCNC: 111 MMOL/L (ref 101–111)
CHLORIDE SERPL-SCNC: 111 MMOL/L (ref 101–111)
CHLORIDE SERPL-SCNC: 114 MMOL/L (ref 101–111)
CO2 SERPL-SCNC: 20 MMOL/L (ref 22–32)
CO2 SERPL-SCNC: 20 MMOL/L (ref 22–32)
CO2 SERPL-SCNC: 22 MMOL/L (ref 22–32)
CO2 SERPL-SCNC: 23 MMOL/L (ref 22–32)
CREAT BLD-MCNC: 1.24 MG/DL (ref 0.7–1.3)
CREAT BLD-MCNC: 1.32 MG/DL (ref 0.7–1.3)
CREAT BLD-MCNC: 1.51 MG/DL (ref 0.7–1.3)
CREAT BLD-MCNC: 1.74 MG/DL (ref 0.7–1.3)
EOSINOPHIL # BLD AUTO: 0.1 X10(3) UL (ref 0–0.3)
EOSINOPHIL # BLD AUTO: 0.13 X10(3) UL (ref 0–0.3)
EOSINOPHIL NFR BLD AUTO: 1.6 %
EOSINOPHIL NFR BLD AUTO: 4.9 %
ERYTHROCYTE [DISTWIDTH] IN BLOOD BY AUTOMATED COUNT: 13.2 % (ref 11.5–16)
ERYTHROCYTE [DISTWIDTH] IN BLOOD BY AUTOMATED COUNT: 13.9 % (ref 11.5–16)
GLOBULIN PLAS-MCNC: 3.3 G/DL (ref 2.5–4)
GLOBULIN PLAS-MCNC: 3.5 G/DL (ref 2.5–4)
GLOBULIN PLAS-MCNC: 3.6 G/DL (ref 2.5–4)
GLUCOSE BLD-MCNC: 126 MG/DL (ref 70–99)
GLUCOSE BLD-MCNC: 130 MG/DL (ref 65–99)
GLUCOSE BLD-MCNC: 142 MG/DL (ref 70–99)
GLUCOSE BLD-MCNC: 154 MG/DL (ref 70–99)
GLUCOSE BLD-MCNC: 157 MG/DL (ref 70–99)
HCT VFR BLD AUTO: 30.8 % (ref 37–53)
HCT VFR BLD AUTO: 34.8 % (ref 37–53)
HGB BLD-MCNC: 10.5 G/DL (ref 13–17)
HGB BLD-MCNC: 12.1 G/DL (ref 13–17)
IMMATURE GRANULOCYTE COUNT: 0.07 X10(3) UL (ref 0–1)
IMMATURE GRANULOCYTE COUNT: 0.09 X10(3) UL (ref 0–1)
IMMATURE GRANULOCYTE RATIO %: 1.2 %
IMMATURE GRANULOCYTE RATIO %: 3.4 %
LYMPHOCYTES # BLD AUTO: 0.38 X10(3) UL (ref 0.9–4)
LYMPHOCYTES # BLD AUTO: 0.52 X10(3) UL (ref 0.9–4)
LYMPHOCYTES NFR BLD AUTO: 14.3 %
LYMPHOCYTES NFR BLD AUTO: 8.6 %
M PROTEIN MFR SERPL ELPH: 6.4 G/DL (ref 6.1–8.3)
M PROTEIN MFR SERPL ELPH: 6.5 G/DL (ref 6.1–8.3)
M PROTEIN MFR SERPL ELPH: 6.9 G/DL (ref 6.1–8.3)
MCH RBC QN AUTO: 33 PG (ref 27–33.2)
MCH RBC QN AUTO: 33.2 PG (ref 27–33.2)
MCHC RBC AUTO-ENTMCNC: 34.1 G/DL (ref 31–37)
MCHC RBC AUTO-ENTMCNC: 34.8 G/DL (ref 31–37)
MCV RBC AUTO: 95.6 FL (ref 80–99)
MCV RBC AUTO: 96.9 FL (ref 80–99)
MONOCYTES # BLD AUTO: 0.1 X10(3) UL (ref 0.1–1)
MONOCYTES # BLD AUTO: 0.55 X10(3) UL (ref 0.1–1)
MONOCYTES NFR BLD AUTO: 1.6 %
MONOCYTES NFR BLD AUTO: 20.8 %
NEUTROPHIL ABS PRELIM: 1.47 X10 (3) UL (ref 1.3–6.7)
NEUTROPHIL ABS PRELIM: 5.26 X10 (3) UL (ref 1.3–6.7)
NEUTROPHILS # BLD AUTO: 1.47 X10(3) UL (ref 1.3–6.7)
NEUTROPHILS # BLD AUTO: 5.26 X10(3) UL (ref 1.3–6.7)
NEUTROPHILS NFR BLD AUTO: 55.5 %
NEUTROPHILS NFR BLD AUTO: 86.7 %
OSMOLALITY SERPL CALC.SUM OF ELEC: 299 MOSM/KG (ref 275–295)
OSMOLALITY SERPL CALC.SUM OF ELEC: 299 MOSM/KG (ref 275–295)
OSMOLALITY SERPL CALC.SUM OF ELEC: 301 MOSM/KG (ref 275–295)
OSMOLALITY SERPL CALC.SUM OF ELEC: 305 MOSM/KG (ref 275–295)
PLATELET # BLD AUTO: 156 10(3)UL (ref 150–450)
PLATELET # BLD AUTO: 247 10(3)UL (ref 150–450)
POTASSIUM SERPL-SCNC: 4.6 MMOL/L (ref 3.6–5.1)
POTASSIUM SERPL-SCNC: 4.7 MMOL/L (ref 3.6–5.1)
RBC # BLD AUTO: 3.18 X10(6)UL (ref 3.8–5.8)
RBC # BLD AUTO: 3.64 X10(6)UL (ref 3.8–5.8)
RED CELL DISTRIBUTION WIDTH-SD: 45.6 FL (ref 35.1–46.3)
RED CELL DISTRIBUTION WIDTH-SD: 47.1 FL (ref 35.1–46.3)
SODIUM SERPL-SCNC: 136 MMOL/L (ref 136–144)
SODIUM SERPL-SCNC: 141 MMOL/L (ref 136–144)
SODIUM SERPL-SCNC: 141 MMOL/L (ref 136–144)
SODIUM SERPL-SCNC: 142 MMOL/L (ref 136–144)
WBC # BLD AUTO: 2.7 X10(3) UL (ref 4–13)
WBC # BLD AUTO: 6.1 X10(3) UL (ref 4–13)

## 2018-01-01 PROCEDURE — 99213 OFFICE O/P EST LOW 20 MIN: CPT | Performed by: FAMILY MEDICINE

## 2018-01-01 PROCEDURE — 36592 COLLECT BLOOD FROM PICC: CPT

## 2018-01-01 PROCEDURE — 77387 GUIDANCE FOR RADJ TX DLVR: CPT | Performed by: RADIOLOGY

## 2018-01-01 PROCEDURE — 77470 SPECIAL RADIATION TREATMENT: CPT | Performed by: RADIOLOGY

## 2018-01-01 PROCEDURE — 77412 RADIATION TX DELIVERY LVL 3: CPT | Performed by: RADIOLOGY

## 2018-01-01 PROCEDURE — 99215 OFFICE O/P EST HI 40 MIN: CPT | Performed by: INTERNAL MEDICINE

## 2018-01-01 PROCEDURE — 93971 EXTREMITY STUDY: CPT | Performed by: CLINICAL NURSE SPECIALIST

## 2018-01-01 PROCEDURE — 77336 RADIATION PHYSICS CONSULT: CPT | Performed by: RADIOLOGY

## 2018-01-01 PROCEDURE — 93971 EXTREMITY STUDY: CPT | Performed by: INTERNAL MEDICINE

## 2018-01-01 PROCEDURE — 99213 OFFICE O/P EST LOW 20 MIN: CPT

## 2018-01-01 PROCEDURE — 80053 COMPREHEN METABOLIC PANEL: CPT

## 2018-01-01 PROCEDURE — 96523 IRRIG DRUG DELIVERY DEVICE: CPT

## 2018-01-01 PROCEDURE — 78815 PET IMAGE W/CT SKULL-THIGH: CPT | Performed by: INTERNAL MEDICINE

## 2018-01-01 PROCEDURE — 96374 THER/PROPH/DIAG INJ IV PUSH: CPT

## 2018-01-01 PROCEDURE — 83615 LACTATE (LD) (LDH) ENZYME: CPT

## 2018-01-01 PROCEDURE — 82962 GLUCOSE BLOOD TEST: CPT

## 2018-01-01 PROCEDURE — 99211 OFF/OP EST MAY X REQ PHY/QHP: CPT

## 2018-01-01 PROCEDURE — 77290 THER RAD SIMULAJ FIELD CPLX: CPT | Performed by: RADIOLOGY

## 2018-01-01 PROCEDURE — 96367 TX/PROPH/DG ADDL SEQ IV INF: CPT

## 2018-01-01 PROCEDURE — 77338 DESIGN MLC DEVICE FOR IMRT: CPT | Performed by: RADIOLOGY

## 2018-01-01 PROCEDURE — 99213 OFFICE O/P EST LOW 20 MIN: CPT | Performed by: CLINICAL NURSE SPECIALIST

## 2018-01-01 PROCEDURE — 96413 CHEMO IV INFUSION 1 HR: CPT

## 2018-01-01 PROCEDURE — 77293 RESPIRATOR MOTION MGMT SIMUL: CPT | Performed by: RADIOLOGY

## 2018-01-01 PROCEDURE — 77301 RADIOTHERAPY DOSE PLAN IMRT: CPT | Performed by: RADIOLOGY

## 2018-01-01 PROCEDURE — 85025 COMPLETE CBC W/AUTO DIFF WBC: CPT

## 2018-01-01 PROCEDURE — 96361 HYDRATE IV INFUSION ADD-ON: CPT

## 2018-01-01 PROCEDURE — 77373 STRTCTC BDY RAD THER TX DLVR: CPT | Performed by: RADIOLOGY

## 2018-01-01 PROCEDURE — 77300 RADIATION THERAPY DOSE PLAN: CPT | Performed by: RADIOLOGY

## 2018-01-01 PROCEDURE — 96376 TX/PRO/DX INJ SAME DRUG ADON: CPT

## 2018-01-01 PROCEDURE — 96375 TX/PRO/DX INJ NEW DRUG ADDON: CPT

## 2018-01-01 PROCEDURE — 77332 RADIATION TREATMENT AID(S): CPT | Performed by: RADIOLOGY

## 2018-01-01 PROCEDURE — 96366 THER/PROPH/DIAG IV INF ADDON: CPT

## 2018-01-01 PROCEDURE — 77334 RADIATION TREATMENT AID(S): CPT | Performed by: RADIOLOGY

## 2018-01-01 PROCEDURE — 80048 BASIC METABOLIC PNL TOTAL CA: CPT

## 2018-01-01 PROCEDURE — 77399 UNLISTED PX MED RADJ PHYSICS: CPT | Performed by: RADIOLOGY

## 2018-01-01 PROCEDURE — 99214 OFFICE O/P EST MOD 30 MIN: CPT | Performed by: NURSE PRACTITIONER

## 2018-01-01 RX ORDER — SODIUM CHLORIDE 0.9 % (FLUSH) 0.9 %
10 SYRINGE (ML) INJECTION ONCE
Status: CANCELLED | OUTPATIENT
Start: 2018-01-01

## 2018-01-01 RX ORDER — DIPHENHYDRAMINE HCL 25 MG
CAPSULE ORAL
Qty: 2 CAPSULE | Refills: 0 | Status: SHIPPED | OUTPATIENT
Start: 2018-01-01 | End: 2019-01-01

## 2018-01-01 RX ORDER — FUROSEMIDE 10 MG/ML
20 INJECTION INTRAMUSCULAR; INTRAVENOUS ONCE
Status: COMPLETED | OUTPATIENT
Start: 2018-01-01 | End: 2018-01-01

## 2018-01-01 RX ORDER — LEVOFLOXACIN 750 MG/1
750 TABLET ORAL DAILY
Qty: 7 TABLET | Refills: 0 | Status: SHIPPED | OUTPATIENT
Start: 2018-01-01 | End: 2018-01-01

## 2018-01-01 RX ORDER — SODIUM CHLORIDE 0.9 % (FLUSH) 0.9 %
10 SYRINGE (ML) INJECTION ONCE
Status: COMPLETED | OUTPATIENT
Start: 2018-01-01 | End: 2018-01-01

## 2018-01-01 RX ORDER — FUROSEMIDE 10 MG/ML
20 INJECTION INTRAMUSCULAR; INTRAVENOUS ONCE
Status: CANCELLED
Start: 2018-01-01 | End: 2018-01-01

## 2018-01-01 RX ORDER — ROSUVASTATIN CALCIUM 40 MG/1
TABLET, COATED ORAL
Qty: 15 TABLET | Refills: 5 | Status: SHIPPED | OUTPATIENT
Start: 2018-01-01 | End: 2019-01-01

## 2018-01-01 RX ORDER — NAPROXEN SODIUM 550 MG/1
550 TABLET ORAL 2 TIMES DAILY WITH MEALS
Qty: 30 TABLET | Refills: 0 | Status: SHIPPED | OUTPATIENT
Start: 2018-01-01 | End: 2019-01-01

## 2018-01-01 RX ORDER — SODIUM CHLORIDE 9 MG/ML
1000 INJECTION, SOLUTION INTRAVENOUS ONCE
Status: COMPLETED | OUTPATIENT
Start: 2018-01-01 | End: 2018-01-01

## 2018-01-01 RX ORDER — TAMSULOSIN HYDROCHLORIDE 0.4 MG/1
CAPSULE ORAL
Qty: 30 CAPSULE | Refills: 5 | Status: SHIPPED | OUTPATIENT
Start: 2018-01-01 | End: 2019-01-01

## 2018-01-01 RX ORDER — FUROSEMIDE 10 MG/ML
20 INJECTION INTRAMUSCULAR; INTRAVENOUS ONCE
Status: DISCONTINUED | OUTPATIENT
Start: 2018-01-01 | End: 2018-01-01

## 2018-01-01 RX ORDER — CLOPIDOGREL BISULFATE 75 MG/1
TABLET ORAL
Refills: 99 | COMMUNITY
Start: 2018-01-01

## 2018-01-01 RX ORDER — PREDNISONE 50 MG/1
TABLET ORAL
Qty: 3 TABLET | Refills: 0 | Status: SHIPPED | OUTPATIENT
Start: 2018-01-01 | End: 2019-01-01

## 2018-01-01 RX ADMIN — SODIUM CHLORIDE 0.9 % (FLUSH) 10 ML: 0.9 % SYRINGE (ML) INJECTION at 13:11:00

## 2018-01-01 RX ADMIN — SODIUM CHLORIDE 0.9 % (FLUSH) 10 ML: 0.9 % SYRINGE (ML) INJECTION at 13:00:00

## 2018-01-01 RX ADMIN — SODIUM CHLORIDE 0.9 % (FLUSH) 10 ML: 0.9 % SYRINGE (ML) INJECTION at 13:20:00

## 2018-01-01 RX ADMIN — SODIUM CHLORIDE 0.9 % (FLUSH) 10 ML: 0.9 % SYRINGE (ML) INJECTION at 08:45:00

## 2018-01-01 RX ADMIN — SODIUM CHLORIDE 0.9 % (FLUSH) 10 ML: 0.9 % SYRINGE (ML) INJECTION at 12:45:00

## 2018-01-01 RX ADMIN — SODIUM CHLORIDE 0.9 % (FLUSH) 10 ML: 0.9 % SYRINGE (ML) INJECTION at 13:10:00

## 2018-01-01 RX ADMIN — SODIUM CHLORIDE 0.9 % (FLUSH) 10 ML: 0.9 % SYRINGE (ML) INJECTION at 13:30:00

## 2018-01-01 RX ADMIN — SODIUM CHLORIDE 1000 ML: 9 INJECTION, SOLUTION INTRAVENOUS at 14:10:00

## 2018-01-01 RX ADMIN — SODIUM CHLORIDE 0.9 % (FLUSH) 10 ML: 0.9 % SYRINGE (ML) INJECTION at 13:15:00

## 2018-01-01 RX ADMIN — FUROSEMIDE 20 MG: 10 INJECTION INTRAMUSCULAR; INTRAVENOUS at 15:15:00

## 2018-02-12 PROBLEM — I70.219 ATHEROSCLEROSIS OF ARTERY OF EXTREMITY WITH INTERMITTENT CLAUDICATION (HCC): Status: ACTIVE | Noted: 2018-02-12

## 2018-02-12 PROBLEM — Z95.828 HISTORY OF COMMON CAROTID ARTERY STENT PLACEMENT: Status: ACTIVE | Noted: 2018-02-12

## 2018-02-12 PROBLEM — Z98.890 HISTORY OF COMMON CAROTID ARTERY STENT PLACEMENT: Status: ACTIVE | Noted: 2018-02-12

## 2018-02-22 ENCOUNTER — TELEPHONE (OUTPATIENT)
Dept: FAMILY MEDICINE CLINIC | Facility: CLINIC | Age: 73
End: 2018-02-22

## 2018-02-22 NOTE — TELEPHONE ENCOUNTER
Received fax from [facility], medical clearance needed for extractions simple & surgical, fax placed in triage

## 2018-02-23 NOTE — TELEPHONE ENCOUNTER
LOV 11/27/17- acute  Last physical 7/28/17  Will you review form and let us know if your able to fill out- form in Dr. Henry Rdz office

## 2018-02-26 NOTE — TELEPHONE ENCOUNTER
Note completed and placed in my fax section  Pt will need to hold ASA a week prior to his dental procedure.

## 2018-02-27 ENCOUNTER — OFFICE VISIT (OUTPATIENT)
Dept: FAMILY MEDICINE CLINIC | Facility: CLINIC | Age: 73
End: 2018-02-27

## 2018-02-27 VITALS
SYSTOLIC BLOOD PRESSURE: 120 MMHG | HEART RATE: 60 BPM | RESPIRATION RATE: 14 BRPM | TEMPERATURE: 99 F | DIASTOLIC BLOOD PRESSURE: 60 MMHG

## 2018-02-27 DIAGNOSIS — S09.93XA TOOTH INJURY, INITIAL ENCOUNTER: ICD-10-CM

## 2018-02-27 DIAGNOSIS — I73.9 PAD (PERIPHERAL ARTERY DISEASE) (HCC): ICD-10-CM

## 2018-02-27 DIAGNOSIS — I65.23 BILATERAL CAROTID ARTERY STENOSIS: ICD-10-CM

## 2018-02-27 DIAGNOSIS — Z01.818 PREOP EXAMINATION: Primary | ICD-10-CM

## 2018-02-27 DIAGNOSIS — I70.219 ATHEROSCLEROSIS OF ARTERY OF EXTREMITY WITH INTERMITTENT CLAUDICATION (HCC): ICD-10-CM

## 2018-02-27 PROCEDURE — 99213 OFFICE O/P EST LOW 20 MIN: CPT | Performed by: FAMILY MEDICINE

## 2018-02-27 RX ORDER — GLUCOSA SU 2KCL/CHONDROITIN SU 500-400 MG
200 CAPSULE ORAL DAILY
COMMUNITY

## 2018-02-27 NOTE — PROGRESS NOTES
Patient presents with:  Medical Clearance (constitutional): dentist will be fixing broekn tooth-wants to go over stopping Aspirin and Plavix for how long before procedure     HPI:   Mary Hines is a 71year old male who presents to the office for cleara are noted in HPI.   EXAM:   /60   Pulse 60   Temp 98.7 °F (37.1 °C) (Oral)   Resp 14     General appearance - alert, well appearing, and in no distress  ASSESSMENT AND PLAN:     Aida Donohue was seen in the office today:  had concerns including Medi

## 2018-03-07 DIAGNOSIS — I10 ESSENTIAL HYPERTENSION, BENIGN: ICD-10-CM

## 2018-03-07 RX ORDER — ENALAPRIL MALEATE 10 MG/1
TABLET ORAL
Qty: 30 TABLET | Refills: 5 | Status: SHIPPED | OUTPATIENT
Start: 2018-03-07 | End: 2018-06-13

## 2018-06-04 DIAGNOSIS — E78.00 PURE HYPERCHOLESTEROLEMIA: ICD-10-CM

## 2018-06-05 NOTE — TELEPHONE ENCOUNTER
Refill request for Rosuvastatin passes protocol, but there is a yellow box warning because of allergies/contraindications. Also  There is no diagnosis of BPH for the Tamsulosin refill request.    LOV 2/17/18.  Future Appointments  Date Time Provider Mino

## 2018-06-07 RX ORDER — TAMSULOSIN HYDROCHLORIDE 0.4 MG/1
CAPSULE ORAL
Qty: 30 CAPSULE | Refills: 5 | Status: SHIPPED | OUTPATIENT
Start: 2018-06-07 | End: 2018-06-13

## 2018-06-07 RX ORDER — ROSUVASTATIN CALCIUM 20 MG/1
TABLET, COATED ORAL
Qty: 30 TABLET | Refills: 5 | Status: SHIPPED | OUTPATIENT
Start: 2018-06-07 | End: 2018-06-13

## 2018-06-08 ENCOUNTER — TELEPHONE (OUTPATIENT)
Dept: FAMILY MEDICINE CLINIC | Facility: CLINIC | Age: 73
End: 2018-06-08

## 2018-06-08 DIAGNOSIS — N18.2 TYPE 2 DIABETES MELLITUS WITH STAGE 2 CHRONIC KIDNEY DISEASE, WITHOUT LONG-TERM CURRENT USE OF INSULIN (HCC): ICD-10-CM

## 2018-06-08 DIAGNOSIS — R80.9 MICROALBUMINURIA DUE TO TYPE 2 DIABETES MELLITUS (HCC): Primary | ICD-10-CM

## 2018-06-08 DIAGNOSIS — E11.29 MICROALBUMINURIA DUE TO TYPE 2 DIABETES MELLITUS (HCC): Primary | ICD-10-CM

## 2018-06-08 DIAGNOSIS — E11.22 TYPE 2 DIABETES MELLITUS WITH STAGE 2 CHRONIC KIDNEY DISEASE, WITHOUT LONG-TERM CURRENT USE OF INSULIN (HCC): ICD-10-CM

## 2018-06-08 DIAGNOSIS — Z12.5 SCREENING FOR MALIGNANT NEOPLASM OF PROSTATE: ICD-10-CM

## 2018-06-08 NOTE — TELEPHONE ENCOUNTER
Pt scheduled his non medicare physical with Dr. Danielle Dumont for 7/30/18 some labs have been ordered by Dian Nieves NP do you want any additional labs done? if so please call into [address] [facility]. Thank you.

## 2018-06-11 ENCOUNTER — TELEPHONE (OUTPATIENT)
Dept: FAMILY MEDICINE CLINIC | Facility: CLINIC | Age: 73
End: 2018-06-11

## 2018-06-11 RX ORDER — CLOPIDOGREL BISULFATE 75 MG/1
75 TABLET ORAL DAILY
Qty: 90 TABLET | Refills: 3 | Status: ON HOLD | OUTPATIENT
Start: 2018-06-11 | End: 2018-06-14

## 2018-06-11 NOTE — TELEPHONE ENCOUNTER
Clopidogrel Bisulfate (Tab) PLAVIX 75 MG     Please refill to [facility] [phone], [phone] Please put Dr Manan Acosta as prescribing doctor they are still seeing Dr. Tono Penny and he is not longer working with him du

## 2018-06-11 NOTE — TELEPHONE ENCOUNTER
Pt requested refill on Plavix. LOV 02/27/18 and labs 07/28/17. Will you approve? Routed to Dr Mer Looney.

## 2018-06-13 ENCOUNTER — APPOINTMENT (OUTPATIENT)
Dept: CT IMAGING | Facility: HOSPITAL | Age: 73
End: 2018-06-13
Attending: EMERGENCY MEDICINE
Payer: COMMERCIAL

## 2018-06-13 ENCOUNTER — TELEPHONE (OUTPATIENT)
Dept: FAMILY MEDICINE CLINIC | Facility: CLINIC | Age: 73
End: 2018-06-13

## 2018-06-13 ENCOUNTER — APPOINTMENT (OUTPATIENT)
Dept: GENERAL RADIOLOGY | Facility: HOSPITAL | Age: 73
End: 2018-06-13
Attending: EMERGENCY MEDICINE
Payer: COMMERCIAL

## 2018-06-13 ENCOUNTER — HOSPITAL ENCOUNTER (OUTPATIENT)
Facility: HOSPITAL | Age: 73
Setting detail: OBSERVATION
Discharge: HOME | End: 2018-06-14
Attending: EMERGENCY MEDICINE | Admitting: HOSPITALIST
Payer: COMMERCIAL

## 2018-06-13 DIAGNOSIS — R04.2 HEMOPTYSIS: Primary | ICD-10-CM

## 2018-06-13 PROCEDURE — 71045 X-RAY EXAM CHEST 1 VIEW: CPT | Performed by: EMERGENCY MEDICINE

## 2018-06-13 PROCEDURE — 71250 CT THORAX DX C-: CPT | Performed by: EMERGENCY MEDICINE

## 2018-06-13 PROCEDURE — 99220 INITIAL OBSERVATION CARE,LEVL III: CPT | Performed by: HOSPITALIST

## 2018-06-13 RX ORDER — ENALAPRIL MALEATE 10 MG/1
10 TABLET ORAL EVERY EVENING
Status: DISCONTINUED | OUTPATIENT
Start: 2018-06-13 | End: 2018-06-14

## 2018-06-13 RX ORDER — ENALAPRIL MALEATE 10 MG/1
10 TABLET ORAL EVERY EVENING
Status: DISCONTINUED | OUTPATIENT
Start: 2018-06-13 | End: 2018-06-13

## 2018-06-13 RX ORDER — METOCLOPRAMIDE HYDROCHLORIDE 5 MG/ML
10 INJECTION INTRAMUSCULAR; INTRAVENOUS EVERY 8 HOURS PRN
Status: DISCONTINUED | OUTPATIENT
Start: 2018-06-13 | End: 2018-06-14

## 2018-06-13 RX ORDER — ONDANSETRON 2 MG/ML
4 INJECTION INTRAMUSCULAR; INTRAVENOUS EVERY 6 HOURS PRN
Status: DISCONTINUED | OUTPATIENT
Start: 2018-06-13 | End: 2018-06-14

## 2018-06-13 RX ORDER — ALBUTEROL SULFATE 90 UG/1
1 AEROSOL, METERED RESPIRATORY (INHALATION) EVERY 4 HOURS PRN
Status: DISCONTINUED | OUTPATIENT
Start: 2018-06-13 | End: 2018-06-14

## 2018-06-13 RX ORDER — ENALAPRIL MALEATE 10 MG/1
10 TABLET ORAL EVERY EVENING
COMMUNITY
End: 2018-07-19

## 2018-06-13 RX ORDER — ROSUVASTATIN CALCIUM 20 MG/1
20 TABLET, COATED ORAL NIGHTLY
COMMUNITY
End: 2018-07-30

## 2018-06-13 RX ORDER — ALFUZOSIN HYDROCHLORIDE 10 MG/1
10 TABLET, EXTENDED RELEASE ORAL EVERY EVENING
Status: DISCONTINUED | OUTPATIENT
Start: 2018-06-13 | End: 2018-06-13

## 2018-06-13 RX ORDER — TAMSULOSIN HYDROCHLORIDE 0.4 MG/1
0.4 CAPSULE ORAL EVERY EVENING
Status: DISCONTINUED | OUTPATIENT
Start: 2018-06-13 | End: 2018-06-14

## 2018-06-13 RX ORDER — ROSUVASTATIN CALCIUM 20 MG/1
20 TABLET, COATED ORAL NIGHTLY
Status: DISCONTINUED | OUTPATIENT
Start: 2018-06-13 | End: 2018-06-13

## 2018-06-13 RX ORDER — ROSUVASTATIN CALCIUM 20 MG/1
20 TABLET, COATED ORAL NIGHTLY
Status: DISCONTINUED | OUTPATIENT
Start: 2018-06-13 | End: 2018-06-14

## 2018-06-13 RX ORDER — MULTIVIT-MIN/IRON/FOLIC ACID/K 18-600-40
2000 CAPSULE ORAL DAILY
COMMUNITY

## 2018-06-13 RX ORDER — TAMSULOSIN HYDROCHLORIDE 0.4 MG/1
0.4 CAPSULE ORAL EVERY EVENING
COMMUNITY
End: 2018-01-01

## 2018-06-13 NOTE — PLAN OF CARE
AOx4  VSS, on RA  Tele- NSR/SB   Pulmonology and surgery on consult  Accu check QID  Up ad alex  Electrolyte protocol  Sputum cup in room   SCDs refused  Medications sent to the pharmacy- waiting for them to be returned     Patient/Family Goals    • Patient

## 2018-06-13 NOTE — CONSULTS
[facility]  Report of Consultation    Helena Galloway Patient Status:  Observation    10/11/1945 MRN [id number]   [facility] 3NE-A Attending Genevieve Alcantara MD   Hosp Day # 0 PCP Gissel Yoder MD     Reason for Consultation:  hemop pilondial cyst resection  No date: OTHER SURGICAL HISTORY      Comment: tonsillectomy  2010: SHOULDER SURG PROC UNLISTED      Comment: combined with bicep surgery-might have                pins-not certain  Family History   Problem Relation Age of Onset - 50 19 96 % - -   06/13/18 1345 159/70 - - (!) 45 19 96 % - -   06/13/18 1252 135/63 - - 52 18 95 % - -   06/13/18 1200 128/68 - - (!) 48 18 95 % - -   06/13/18 1105 (!) 178/62 97.7 °F (36.5 °C) Temporal 54 17 95 % 5' 7\" (1.702 m) 173 lb (78.5 kg)

## 2018-06-13 NOTE — TELEPHONE ENCOUNTER
Pt called to report that he has been coughing up some blood since 2 am. Pt states he thought it may be r/t his nose being stuffy during the night with CPAP, but mucus from nose came out clear.  Pt states he went back to sleep, then woke up and he is still c

## 2018-06-13 NOTE — ED INITIAL ASSESSMENT (HPI)
Patient with hx of COPD to ED for hemoptysis today. Patient also c/o increased fatigue for the last couple of weeks. Patient states coughs daily, but has never coughed up blood before. Denies fevers.

## 2018-06-13 NOTE — ED PROVIDER NOTES
Patient Seen in: [facility] Emergency Department    History   Patient presents with:  Hemoptysis (respiratory)    Stated Complaint: hemoptysis    AURY Owens is a 52-year-old male presenting to the emergency department for hemoptysis.   Patient sta SURGICAL HISTORY      Comment: tonsillectomy  2010: SHOULDER SURG PROC UNLISTED      Comment: combined with bicep surgery-might have                pins-not certain        Smoking status: Former Smoker Notable for the following:        Result Value    Glucose 134 (*)     BUN 30 (*)     Chloride 113 (*)     CO2 17.0 (*)     All other components within normal limits   PTT, ACTIVATED - Abnormal; Notable for the following:     PTT 25.8 (*)     All other comp

## 2018-06-13 NOTE — H&P
[facility]  History and Physical     Padmini Judith Patient Status:  Emergency    10/11/1945 MRN [id number]   Location [facility] [facility] Attending Loree Christensen MD   Hosp Day # 0 PCP Binu Lantigua MD     Chief Complain Hyperlipidemia 6/29/2012   • Osteoarthrosis, unspecified whether generalized or localized, unspecified site    • Peripheral vascular disease ([facility] [address]    • Personal history of tobacco use, presenting hazards to health    • S/P carotid endarterectomy 3/11/2016 Q10) 100 MG Oral Cap Take 200 mg by mouth daily. Disp:  Rfl:    ascorbic acid 500 MG Oral Tab Take 500 mg by mouth daily. Disp:  Rfl:    Omega-3 Fatty Acids (FISH OIL OR) Take 1,000 mg by mouth daily. Disp:  Rfl:    L-ARGININE OR Take 1,000 mg by mouth. results for input(s): TROP, CK in the last 168 hours. Imaging: Imaging data reviewed in Epic. ASSESSMENT / PLAN:     1. Hemoptysis  1. Ct showing 3.  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

## 2018-06-14 VITALS
WEIGHT: 173.94 LBS | SYSTOLIC BLOOD PRESSURE: 145 MMHG | BODY MASS INDEX: 27.3 KG/M2 | TEMPERATURE: 98 F | HEART RATE: 52 BPM | OXYGEN SATURATION: 93 % | RESPIRATION RATE: 18 BRPM | DIASTOLIC BLOOD PRESSURE: 61 MMHG | HEIGHT: 67 IN

## 2018-06-14 PROCEDURE — 99225 SUBSEQUENT OBSERVATION CARE: CPT | Performed by: HOSPITALIST

## 2018-06-14 NOTE — PROGRESS NOTES
[facility]  Progress Note    Johana Rucker Patient Status:  Observation    10/11/1945 MRN [id number]   [facility] 3NE-A Attending Liam Armendariz MD   Hosp Day # 0 PCP Braeden Beverly MD     Subjective:  Johana Rucker is a 71 year o Mild bilateral bronchiectasis with prominent interstitial markings in the lower lobes. Medications reviewed. Assessment:  1.  Hemoptysis - most likely from new lesion in RLL in setting of Plavix , Radiologically suspicions for CA, possible endobronc

## 2018-06-14 NOTE — PLAN OF CARE
Resumed care of pt. At 1900. Pt. Is agitated, demanding, yelling, and upset he does not yet have his medication that was sent down to pharmacy. Day RN sent pt. Own medications down to pharmacy to be verified, due to MD stating pt.  Could take his own meds

## 2018-06-14 NOTE — PROGRESS NOTES
[facility]  Progress Note     Anton Hamm Patient Status:  Observation    10/11/1945 MRN [id number]   [facility] 3NE-A Attending Bree Lei MD   Hosp Day # 0 PCP Francia Perez MD     Chief Complaint: hemoptysis    S: [facility] concerning for neoplasm with LISA nodule and subpleural nodules in RUL AND LISA , mild bronchiectasis with prominent interstitial markings in the lower lobes  2.  He had an attempted biopsy of one of the nodules last year but did not want it repeated as it wa

## 2018-06-14 NOTE — PAYOR COMM NOTE
--------------  ADMISSION REVIEW     Payor: [facility]  Subscriber #:  [id number]  Authorization Number: N/A    Admit date: N/A  Admit time: N/A       Admitting Physician: Abelardo Rivas MD  Attending Physician:  Abelardo Rivas MD  Primary Care Physician history of tobacco use, presenting hazards to health    • S/P carotid endarterectomy 3/11/2016   • Shortness of breath    • STROKE     TIA 1982 - no residual   • Unspecified sleep apnea 6/29/2012   • Visual impairment     glasses     Review of Systems  Pos - Abnormal; Notable for the following:     RBC 3.79 (*)     HGB 12.7 (*)     HCT 36.8 (*)     MCH 33.5 (*)     Eosinophil Absolute 0.51 (*)     All other components within normal limits   PROTHROMBIN TIME (PT) - Normal   CBC WITH DIFFERENTIAL WITH PLATELET morning and states that he started having some coughing. He did cough something up which he swallowed at that time.   He states that later as his coughing continued he decided to see what he was coughing up and noticed that he had a large amount of bright distress. Alert and oriented x 3. HEENT: Normocephalic atraumatic. Moist mucous membranes. EOM-I. PERRLA. Anicteric. Neck: No lymphadenopathy. No JVD. No carotid bruits. Respiratory: Clear to auscultation bilaterally. No wheezes. No rhonchi.   Cardiovasc stenosis s/p bilateral BOO and stent on the left   6.  RADHA  Quality:  · DVT Prophylaxis: SCDS  · CODE status: full  · Ford: no  Plan of care discussed with patient and family  He refuses to take any medications from our pharmacy that he already uses at [facility] ADMISSION    PLEASE FAX ALL INPT DAYS AS AUTHORIZED ALONG W/NRD

## 2018-06-14 NOTE — CONSULTS
[facility]  Vascular Surgery Consultation    Addy Sierra Patient Status:  Observation    10/11/1945 MRN [id number]   [facility] Attending Ishmael Sparrow MD   Hosp Day # 0 PCP Thomas Cosme MD     Reason for Consultation: Comment: pilondial cyst resection  No date: OTHER SURGICAL HISTORY      Comment: tonsillectomy  2010: SHOULDER SURG PROC UNLISTED      Comment: combined with bicep surgery-might have                pins-not certain  Family History   Problem Relation Age of temporary blindness, double vision, confusion    Physical Exam:  /61 (BP Location: Right arm)   Pulse 52   Temp 98.4 °F (36.9 °C) (Oral)   Resp 18   Ht 5' 7\" (1.702 m)   Wt 173 lb 15.1 oz (78.9 kg)   SpO2 93%   BMI 27.24 kg/m²   GENERAL: alert and o

## 2018-06-15 ENCOUNTER — PATIENT OUTREACH (OUTPATIENT)
Dept: CASE MANAGEMENT | Age: 73
End: 2018-06-15

## 2018-06-15 DIAGNOSIS — R04.2 HEMOPTYSIS: ICD-10-CM

## 2018-06-15 DIAGNOSIS — Z87.891 FORMER SMOKER: ICD-10-CM

## 2018-06-15 RX ORDER — ACETAMINOPHEN 500 MG
1000 TABLET ORAL ONCE
Status: CANCELLED | OUTPATIENT
Start: 2018-06-15 | End: 2018-06-15

## 2018-06-15 NOTE — DISCHARGE SUMMARY
[facility]  DISCHARGE SUMMARY     Jorge Schreiber Patient Status:  Observation    10/11/1945 MRN [id number]   [facility] 3NE-A Attending No att. providers found   Hosp Day # 0 PCP Raphael Gonsalez MD     Date of Admission: 2018 Synopsis:   Patient is a 20-year-old man admitted with hemoptysis.   He had a CT scan which showed a 3 x 3 x 2.3 cm spiculated right infrahilar mass concerning for neoplasm with a left upper lobe nodule and subpleural nodules in the right upper lobe and rig PLAVIX               [place]: [place]    Follow-up appointment:   Horace Graham MD  [address] [phone]    In 2 weeks      Gualberto Betts MD  [address] [facility] [address] [facility]  [phone]

## 2018-06-18 ENCOUNTER — TELEPHONE (OUTPATIENT)
Dept: FAMILY MEDICINE CLINIC | Facility: CLINIC | Age: 73
End: 2018-06-18

## 2018-06-18 NOTE — TELEPHONE ENCOUNTER
Patient discharged from [facility] on 6/14/18 and is at High risk for readmission and recommended to have a TCM HFU by 6/28/18 or sooner.  [facility] attempted to schedule TCM HFU patient states that he has an appointment for a physical at the end of July and

## 2018-06-18 NOTE — PROGRESS NOTES
Initial Post Discharge Follow Up   Discharge Date: 6/14/18  Contact Date: 6/15/2018    Consent Verification:  Assessment Completed With: Patient  HIPAA Verified?   Yes    Discharge Dx:   Hemoptysis, COPD and bronchiectasis, spiculated right infrahilar ma OIL OR) Take 1,000 mg by mouth daily. Disp:  Rfl:    L-ARGININE OR Take 1,000 mg by mouth.    Disp:  Rfl:      • When you were leaving the hospital were any medication changes discussed with you? yes  • Did you  your discharge medications when you appointments:       Your appointments     Date & Time Appointment Department [facility])    Jul 16, 2018 11:20 AM CDT FOLLOW UP with Pebbles Serrano MD [facility] CARDIOLOGY [facility] [place] at [address])    Jul 30, 2018  9:30 AM CDT Physical - Established Pa to PCP.

## 2018-06-20 LAB
AMB EXT CHOLESTEROL, TOTAL: 119 MG/DL
AMB EXT CREATININE: 1.13 MG/DL
AMB EXT GFR: 64
AMB EXT GLUCOSE: 127 MG/DL
AMB EXT HDL CHOLESTEROL: 48 MG/DL
AMB EXT HGBA1C: 6.9 %
AMB EXT LDL CHOLESTEROL, DIRECT: 58 MG/DL
AMB EXT TRIGLYCERIDES: 63 MG/DL
AMB EXT TSH: 1.76 MIU/ML

## 2018-06-21 ENCOUNTER — ANESTHESIA EVENT (OUTPATIENT)
Dept: ENDOSCOPY | Facility: HOSPITAL | Age: 73
End: 2018-06-21
Payer: COMMERCIAL

## 2018-06-22 ENCOUNTER — ANESTHESIA (OUTPATIENT)
Dept: ENDOSCOPY | Facility: HOSPITAL | Age: 73
End: 2018-06-22
Payer: COMMERCIAL

## 2018-06-22 ENCOUNTER — SURGERY (OUTPATIENT)
Age: 73
End: 2018-06-22

## 2018-06-22 ENCOUNTER — HOSPITAL ENCOUNTER (OUTPATIENT)
Facility: HOSPITAL | Age: 73
Setting detail: HOSPITAL OUTPATIENT SURGERY
Discharge: HOME | End: 2018-06-22
Attending: INTERNAL MEDICINE | Admitting: INTERNAL MEDICINE
Payer: COMMERCIAL

## 2018-06-22 VITALS
TEMPERATURE: 98 F | OXYGEN SATURATION: 99 % | SYSTOLIC BLOOD PRESSURE: 157 MMHG | RESPIRATION RATE: 16 BRPM | HEART RATE: 59 BPM | WEIGHT: 173 LBS | BODY MASS INDEX: 27.15 KG/M2 | DIASTOLIC BLOOD PRESSURE: 71 MMHG | HEIGHT: 67 IN

## 2018-06-22 PROCEDURE — 88381 MICRODISSECTION MANUAL: CPT

## 2018-06-22 PROCEDURE — 88172 CYTP DX EVAL FNA 1ST EA SITE: CPT | Performed by: INTERNAL MEDICINE

## 2018-06-22 PROCEDURE — 88342 IMHCHEM/IMCYTCHM 1ST ANTB: CPT | Performed by: INTERNAL MEDICINE

## 2018-06-22 PROCEDURE — 88305 TISSUE EXAM BY PATHOLOGIST: CPT | Performed by: INTERNAL MEDICINE

## 2018-06-22 PROCEDURE — 81235 EGFR GENE COM VARIANTS: CPT

## 2018-06-22 PROCEDURE — 88360 TUMOR IMMUNOHISTOCHEM/MANUAL: CPT

## 2018-06-22 PROCEDURE — 88341 IMHCHEM/IMCYTCHM EA ADD ANTB: CPT

## 2018-06-22 PROCEDURE — 88173 CYTOPATH EVAL FNA REPORT: CPT | Performed by: INTERNAL MEDICINE

## 2018-06-22 PROCEDURE — 88177 CYTP FNA EVAL EA ADDL: CPT | Performed by: INTERNAL MEDICINE

## 2018-06-22 PROCEDURE — 81210 BRAF GENE: CPT

## 2018-06-22 PROCEDURE — 0BDF8ZX EXTRACTION OF RIGHT LOWER LUNG LOBE, VIA NATURAL OR ARTIFICIAL OPENING ENDOSCOPIC, DIAGNOSTIC: ICD-10-PCS | Performed by: INTERNAL MEDICINE

## 2018-06-22 PROCEDURE — 88341 IMHCHEM/IMCYTCHM EA ADD ANTB: CPT | Performed by: INTERNAL MEDICINE

## 2018-06-22 PROCEDURE — 07B74ZX EXCISION OF THORAX LYMPHATIC, PERCUTANEOUS ENDOSCOPIC APPROACH, DIAGNOSTIC: ICD-10-PCS | Performed by: INTERNAL MEDICINE

## 2018-06-22 PROCEDURE — 88342 IMHCHEM/IMCYTCHM 1ST ANTB: CPT

## 2018-06-22 RX ORDER — DEXTROSE MONOHYDRATE 25 G/50ML
50 INJECTION, SOLUTION INTRAVENOUS
Status: DISCONTINUED | OUTPATIENT
Start: 2018-06-22 | End: 2018-06-22

## 2018-06-22 RX ORDER — IPRATROPIUM BROMIDE AND ALBUTEROL SULFATE 2.5; .5 MG/3ML; MG/3ML
SOLUTION RESPIRATORY (INHALATION)
Status: COMPLETED
Start: 2018-06-22 | End: 2018-06-22

## 2018-06-22 RX ORDER — LIDOCAINE HYDROCHLORIDE 20 MG/ML
INJECTION, SOLUTION INFILTRATION; PERINEURAL
Status: DISCONTINUED | OUTPATIENT
Start: 2018-06-22 | End: 2018-06-22

## 2018-06-22 RX ORDER — SODIUM CHLORIDE, SODIUM LACTATE, POTASSIUM CHLORIDE, CALCIUM CHLORIDE 600; 310; 30; 20 MG/100ML; MG/100ML; MG/100ML; MG/100ML
INJECTION, SOLUTION INTRAVENOUS CONTINUOUS
Status: DISCONTINUED | OUTPATIENT
Start: 2018-06-22 | End: 2018-06-22

## 2018-06-22 RX ORDER — LIDOCAINE HYDROCHLORIDE 40 MG/ML
1 INJECTION, SOLUTION RETROBULBAR; TOPICAL ONCE
Status: DISCONTINUED | OUTPATIENT
Start: 2018-06-22 | End: 2018-06-22

## 2018-06-22 RX ORDER — NALOXONE HYDROCHLORIDE 0.4 MG/ML
80 INJECTION, SOLUTION INTRAMUSCULAR; INTRAVENOUS; SUBCUTANEOUS AS NEEDED
Status: DISCONTINUED | OUTPATIENT
Start: 2018-06-22 | End: 2018-06-22

## 2018-06-22 RX ORDER — ONDANSETRON 2 MG/ML
4 INJECTION INTRAMUSCULAR; INTRAVENOUS ONCE AS NEEDED
Status: DISCONTINUED | OUTPATIENT
Start: 2018-06-22 | End: 2018-06-22

## 2018-06-22 RX ORDER — ONDANSETRON 2 MG/ML
4 INJECTION INTRAMUSCULAR; INTRAVENOUS AS NEEDED
Status: DISCONTINUED | OUTPATIENT
Start: 2018-06-22 | End: 2018-06-22

## 2018-06-22 RX ORDER — IPRATROPIUM BROMIDE AND ALBUTEROL SULFATE 2.5; .5 MG/3ML; MG/3ML
3 SOLUTION RESPIRATORY (INHALATION) ONCE
Status: COMPLETED | OUTPATIENT
Start: 2018-06-22 | End: 2018-06-22

## 2018-06-22 NOTE — ANESTHESIA PREPROCEDURE EVALUATION
PRE-OP EVALUATION    Patient Name: Jorge Schreiber    Pre-op Diagnosis: RIGHT UPPER LOBE MASS, ADENOPATHY, HEMOPTYSIS    Procedure(s):  ENDOBRONCHIAL ULTRASOUND AND CYTOLOGY BRONCHOSCOPY WITH C-ARM AND RADIAL PROBE ULTRASOUND    Surgeon(s) and Role:     * G (peripheral artery disease) ([facility] [address]            Past Surgical History:  No date: CAROTID ENDARTERECTOMY Left  2015: CAROTID ENDARTERECTOMY  No date: CATH BARE METAL STENT (BMS)      Comment: left *carotid* stent 02/16  2006: COLONOSCOPY  10/2016: COLONOSCOPY agreed to proceed  Plan/risks discussed with: patient                Present on Admission:  **None**

## 2018-06-22 NOTE — INTERVAL H&P NOTE
Pre-op Diagnosis: RIGHT UPPER LOBE MASS, ADENOPATHY, HEMOPTYSIS    The above referenced H&P was reviewed by Sneha Harrison MD on 6/22/2018, the patient was examined and no significant changes have occurred in the patient's condition since the H&P was pe

## 2018-06-22 NOTE — ANESTHESIA POSTPROCEDURE EVALUATION
[address] [facility] Patient Status:  Hospital Outpatient Surgery   Age/Gender 71year old male MRN [id number]   [facility] ENDOSCOPY Attending Bennie Mitchell MD   [facility] Day # 0 PCP Raphael Gonsalez MD       Anesthesia Post-op

## 2018-06-22 NOTE — H&P (VIEW-ONLY)
[facility]  Report of Consultation    Palacios Fail Patient Status:  Observation    10/11/1945 MRN [id number]   [facility] 3NE-A Attending Rigoberto Raza MD   Hosp Day # 0 PCP Hervey Collet, MD     Reason for Consultation:  hemop pilondial cyst resection  No date: OTHER SURGICAL HISTORY      Comment: tonsillectomy  2010: SHOULDER SURG PROC UNLISTED      Comment: combined with bicep surgery-might have                pins-not certain  Family History   Problem Relation Age of Onset - 50 19 96 % - -   06/13/18 1345 159/70 - - (!) 45 19 96 % - -   06/13/18 1252 135/63 - - 52 18 95 % - -   06/13/18 1200 128/68 - - (!) 48 18 95 % - -   06/13/18 1105 (!) 178/62 97.7 °F (36.5 °C) Temporal 54 17 95 % 5' 7\" (1.702 m) 173 lb (78.5 kg)

## 2018-06-22 NOTE — OPERATIVE REPORT
[address] [facility] Patient Status:  Hospital Outpatient Surgery    10/11/1945 MRN [id number]   [facility] ENDOSCOPY Attending Escobar Peoples MD   [address] [facility] [address] Day # 0 PCP Dustin Chaudhari MD     OPERATIVE REPORT:    DATE OF OPE lidocaine given for local anesthesia. Ultrasound bronchoscope was inserted orally through a bite block. Upper airways were crowded. The vocal cords approximated well. Trachea was intubated.  Evaluation of the trachea and main stem airways did not reveal crystal

## 2018-06-27 ENCOUNTER — OFFICE VISIT (OUTPATIENT)
Dept: HEMATOLOGY/ONCOLOGY | Facility: HOSPITAL | Age: 73
End: 2018-06-27
Attending: INTERNAL MEDICINE
Payer: COMMERCIAL

## 2018-06-27 ENCOUNTER — HOSPITAL ENCOUNTER (OUTPATIENT)
Dept: RADIATION ONCOLOGY | Facility: HOSPITAL | Age: 73
Discharge: HOME | End: 2018-06-27
Attending: RADIOLOGY
Payer: COMMERCIAL

## 2018-06-27 VITALS
BODY MASS INDEX: 27.23 KG/M2 | RESPIRATION RATE: 18 BRPM | OXYGEN SATURATION: 99 % | TEMPERATURE: 99 F | HEIGHT: 67 IN | DIASTOLIC BLOOD PRESSURE: 70 MMHG | WEIGHT: 173.5 LBS | HEART RATE: 94 BPM | SYSTOLIC BLOOD PRESSURE: 141 MMHG

## 2018-06-27 DIAGNOSIS — R04.2 HEMOPTYSIS: ICD-10-CM

## 2018-06-27 DIAGNOSIS — Z51.11 ENCOUNTER FOR ANTINEOPLASTIC CHEMOTHERAPY: ICD-10-CM

## 2018-06-27 DIAGNOSIS — C34.90 LUNG CANCER (HCC): Primary | ICD-10-CM

## 2018-06-27 DIAGNOSIS — R59.0 MEDIASTINAL ADENOPATHY: ICD-10-CM

## 2018-06-27 DIAGNOSIS — C34.31 MALIGNANT NEOPLASM OF LOWER LOBE OF RIGHT LUNG (HCC): Primary | ICD-10-CM

## 2018-06-27 PROCEDURE — 99205 OFFICE O/P NEW HI 60 MIN: CPT | Performed by: INTERNAL MEDICINE

## 2018-06-27 RX ORDER — DIPHENHYDRAMINE HCL 25 MG
CAPSULE ORAL
Qty: 2 CAPSULE | Refills: 1 | Status: SHIPPED | OUTPATIENT
Start: 2018-06-27 | End: 2018-07-18 | Stop reason: ALTCHOICE

## 2018-06-27 RX ORDER — ASPIRIN 325 MG
162.5 TABLET ORAL DAILY
COMMUNITY

## 2018-06-27 RX ORDER — PREDNISONE 50 MG/1
50 TABLET ORAL DAILY
Qty: 3 TABLET | Refills: 1 | Status: SHIPPED | OUTPATIENT
Start: 2018-06-27 | End: 2018-06-30

## 2018-06-27 NOTE — PATIENT INSTRUCTIONS
Dr. Joby Bennett nurse line Monday through Friday 84:30:  [phone]  After hours or weekends for emergent needs:  [phone].      To schedule testing, Central [phone]  For Medical Records: [phone]

## 2018-06-27 NOTE — CONSULTS
Cancer Center Report of Consultation    Patient Name: Anton Hamm   YOB: 1945   Medical Record Number: [id number]   [facility]: [id number]   Consulting Physician: Kianna Hair MD  Referring Physician(s):   Dr. Ross Salinas, recently 2/23/18. These showed previously seen nodules were stable with dominant nodule in LISA measuring about 7 mm.   Case Report     Component Discrete Value Range Status   Case Report   Medical Cytology Report                           Case: [id number]   for cell block preparation.        B- Labeled with the patient's name, medical record number, EBUS-guided fine needle aspiration of 7 subcarinal lymph node, consisting of a single pass for slides, with one Diff-Quik stained air-dried slide, one Pap stained TTF-1 (focal). They are negative for synaptophysin and chromogranin. The morphology and immunoprofile support the diagnosis of a non-small cell carcinoma with squamoid features. Dr. Sarika Pires has reviewed the case and concurs.   Case discussed with Dr. Kory Real the patient's name and medical record number, endobronchial lesion, right lower lobe, received in formalin: the specimen consists of multiple flecks of pink to yellow-tan tissue each measuring 0.1 cm in greatest dimension and measuring in aggregate 1.0 x 0 CT-guided biopsy was elected.         Gross Description   Labeled with the patient's name and medical record number, left upper lobe lung nodule (20 gauge cores x4), received in formalin:  Specimen consists of multiple flecks of pink to yellow-tan needle co Comment: tonsillectomy  2010: SHOULDER SURG PROC UNLISTED      Comment: combined with bicep surgery-might have                pins-not certain    Family Medical History:  Family History   Problem Relation Age of Onset   • Cancer Father      prostate cancer into the lungs 2 (two) times daily. , Disp: , Rfl:   •  Tiotropium Bromide Monohydrate 18 MCG Inhalation Cap, Inhale into the lungs daily. , Disp: , Rfl:   •  Rosuvastatin Calcium 20 MG Oral Tab, Take 20 mg by mouth nightly., Disp: , Rfl:   •  tamsulosin HCl appropriate.     Laboratory:  Lab Component   Component Value Date/Time    RED BLOOD CELL COUNT 4.28 06/14/2012 0915    RED BLOOD CELL COUNT 4.23 11/23/2011 0730    RED BLOOD COUNT 3.19 (L) 05/12/2013 0851    RED BLOOD COUNT 2.87 (L) 05/11/2013 0345    RED 05/12/2013 0851    RED CELL DISTRIBUTION WIDTH 13.4 05/11/2013 0345    RED CELL DISTRIBUTION WIDTH 13.4 05/10/2013 1500    PRELIMINARY NEUTROPHIL ABS 4.68 05/12/2013 0851    PRELIMINARY NEUTROPHIL ABS 3.59 05/06/2013 1010    Neutrophil Absolute Prelim 3.59 02/28/2013 0845    eGFR  113 06/14/2012 0915    GFR  71 05/11/2013 0345    GFR  78 05/06/2013 1010    GFR  86 12/05/2012 2035    GFR, -American 79 06/13/2018 1110    eGFR NON-AFFABIENNE WILSON 12/05/2012 2035    CHLORIDE 105 06/14/2012 0915    CARBON DIOXIDE 24 09/17/2013 0904    CARBON DIOXIDE 24 02/28/2013 0845    CARBON DIOXIDE 21 06/14/2012 0915    CO2 17.0 (L) 06/13/2018 1110    CO2 22.0 07/28/2017 0730    CO2 21.0 (L) 07/28/2016 1006    CO hemoptysis     TECHNIQUE:  Unenhanced multislice CT scanning is performed through the chest.  Dose reduction techniques were used.  Dose information is transmitted to the ACR ([facility] [employer] [facility] Radiology) Michael Kemp 35 ([address]) which inc 02/23/2018  1:34 PM  Please disregard the Lung-Rads categorization and recommendations, as   patient is not part of the lung screening protocol. Instead, please refer to Fleischner criteria below.     [id number] [facility] required  *  nodule size > or = 6mm: follow up CT at 6-12 months, then every 2 years   until 5 years    Solitary part-solid nodule  *  nodule size <6mm: no CT follow-up required  *  nodule size > or = 6mm  *  follow-up CT at 3-6 months  *  if unchanged, an pericardial effusion or pleural effusion. There is no definite mediastinal adenopathy. Evaluation for hilar adenopathy is   limited without intravenous contrast.    The thyroid gland is within normal limits.     Visualized portions of the upper abdomen demo will meet with Rad Onc as well today to discuss RT. We will also arrange for chemo teaching. Off Plavix his hemoptysis has abated. We discussed signs and symptoms to watch out for that warrant attention.      I will let them know of his imaging results

## 2018-06-28 PROBLEM — C34.90 LUNG CANCER (HCC): Status: ACTIVE | Noted: 2018-06-28

## 2018-06-28 NOTE — CONSULTS
[facility]    PATIENT'S NAME: Sully Javierpaul   RADIATION ONCOLOGIST: Lolly Dalton. Guerrero Rodriguez M.D.    PATIENT ACCOUNT #: [de-identified] Elias Mas   [facility]   MEDICAL RECORD #: [id number] YOB: 1945   CONSULTATION DATE: 06/27/2018       RADIATIO The patient has a past history of carotid artery stenosis, COPD, hypertension, high cholesterol, osteoarthritis, peripheral vascular disease, status post TIA, and sleep apnea.     PAST SURGICAL HISTORY:  Shoulder surgery, carotid endarterectomy, pilonidal c This is a 78-year-old male with the recent diagnosis of at least a stage IIIA non-small cell carcinoma of the right lung. He has a positive intrabronchial mass as well as proven disease at station 7. He is in otherwise good performance status.     Savannah Mendes his wife in detail and told them that this is generally treatable with steroids but occasionally becomes a progressive and life-threatening problem.   Still, the overall likelihood is low as it does not appear that a large amount of overall lung will be irr

## 2018-06-29 ENCOUNTER — OFFICE VISIT (OUTPATIENT)
Dept: HEMATOLOGY/ONCOLOGY | Facility: HOSPITAL | Age: 73
End: 2018-06-29
Attending: INTERNAL MEDICINE
Payer: COMMERCIAL

## 2018-06-29 ENCOUNTER — TELEPHONE (OUTPATIENT)
Dept: HEMATOLOGY/ONCOLOGY | Facility: HOSPITAL | Age: 73
End: 2018-06-29

## 2018-06-29 ENCOUNTER — HOSPITAL ENCOUNTER (OUTPATIENT)
Dept: NUCLEAR MEDICINE | Facility: HOSPITAL | Age: 73
Discharge: HOME | End: 2018-06-29
Attending: INTERNAL MEDICINE
Payer: COMMERCIAL

## 2018-06-29 DIAGNOSIS — C34.01 MALIGNANT NEOPLASM OF HILUS OF RIGHT LUNG (HCC): Primary | ICD-10-CM

## 2018-06-29 DIAGNOSIS — K76.9 LIVER LESION: Primary | ICD-10-CM

## 2018-06-29 DIAGNOSIS — C34.91 BRONCHOGENIC LUNG CANCER, RIGHT (HCC): ICD-10-CM

## 2018-06-29 DIAGNOSIS — C34.90 LUNG CANCER (HCC): ICD-10-CM

## 2018-06-29 LAB — GLUCOSE BLD-MCNC: 146 MG/DL (ref 65–99)

## 2018-06-29 PROCEDURE — 82962 GLUCOSE BLOOD TEST: CPT

## 2018-06-29 PROCEDURE — 78815 PET IMAGE W/CT SKULL-THIGH: CPT | Performed by: INTERNAL MEDICINE

## 2018-06-29 RX ORDER — DEXAMETHASONE 4 MG/1
8 TABLET ORAL DAILY
Qty: 6 TABLET | Refills: 3 | Status: SHIPPED | OUTPATIENT
Start: 2018-06-29 | End: 2018-01-01

## 2018-06-29 RX ORDER — ONDANSETRON HYDROCHLORIDE 8 MG/1
8 TABLET, FILM COATED ORAL EVERY 8 HOURS PRN
Qty: 30 TABLET | Refills: 3 | Status: SHIPPED | OUTPATIENT
Start: 2018-06-29 | End: 2018-01-01

## 2018-06-29 RX ORDER — PROCHLORPERAZINE MALEATE 10 MG
10 TABLET ORAL EVERY 6 HOURS PRN
Qty: 30 TABLET | Refills: 3 | Status: SHIPPED | OUTPATIENT
Start: 2018-06-29 | End: 2018-01-01

## 2018-06-29 NOTE — TELEPHONE ENCOUNTER
Called patient and wife regarding PET results. Was unable to speak to Mr. Jany Carpenter- went to voicemail but spoke to his wife. PET showed uptake in right lung mass, subcarinal node and liver lesion. Recommended biopsy of liver lesion.  She verbalized understandi

## 2018-07-01 ENCOUNTER — HOSPITAL ENCOUNTER (OUTPATIENT)
Dept: RADIATION ONCOLOGY | Facility: HOSPITAL | Age: 73
Discharge: HOME | End: 2018-07-01
Attending: RADIOLOGY
Payer: COMMERCIAL

## 2018-07-02 ENCOUNTER — TELEPHONE (OUTPATIENT)
Dept: HEMATOLOGY/ONCOLOGY | Facility: HOSPITAL | Age: 73
End: 2018-07-02

## 2018-07-02 ENCOUNTER — SOCIAL WORK SERVICES (OUTPATIENT)
Dept: HEMATOLOGY/ONCOLOGY | Facility: HOSPITAL | Age: 73
End: 2018-07-02

## 2018-07-02 ENCOUNTER — HOSPITAL ENCOUNTER (OUTPATIENT)
Dept: RADIATION ONCOLOGY | Facility: HOSPITAL | Age: 73
Discharge: HOME | End: 2018-07-02
Attending: RADIOLOGY
Payer: COMMERCIAL

## 2018-07-02 PROCEDURE — 77334 RADIATION TREATMENT AID(S): CPT | Performed by: RADIOLOGY

## 2018-07-02 PROCEDURE — 77399 UNLISTED PX MED RADJ PHYSICS: CPT | Performed by: RADIOLOGY

## 2018-07-02 NOTE — TELEPHONE ENCOUNTER
Patient called asking how long the Ct Guided liver biopsy will be. Was told that it may take up to 2-3 hours. Explained that he will have to be stable before they can discharge him. His Chemo Ed will take about 1 hour.  Chemotherapy will take about 7 hour

## 2018-07-03 LAB
ADEQUACY OF SPECIMEN: ADEQUATE
BRAF CODON 600 MUTATION DETECT: NOT DETECTED

## 2018-07-05 ENCOUNTER — APPOINTMENT (OUTPATIENT)
Dept: LAB | Facility: HOSPITAL | Age: 73
End: 2018-07-05
Attending: INTERNAL MEDICINE
Payer: COMMERCIAL

## 2018-07-05 ENCOUNTER — HOSPITAL ENCOUNTER (OUTPATIENT)
Dept: ULTRASOUND IMAGING | Facility: HOSPITAL | Age: 73
Discharge: HOME | End: 2018-07-05
Attending: INTERNAL MEDICINE
Payer: COMMERCIAL

## 2018-07-05 VITALS
WEIGHT: 173 LBS | DIASTOLIC BLOOD PRESSURE: 56 MMHG | RESPIRATION RATE: 18 BRPM | TEMPERATURE: 99 F | OXYGEN SATURATION: 95 % | HEART RATE: 52 BPM | BODY MASS INDEX: 27.15 KG/M2 | HEIGHT: 67 IN | SYSTOLIC BLOOD PRESSURE: 152 MMHG

## 2018-07-05 DIAGNOSIS — C34.91 BRONCHOGENIC LUNG CANCER, RIGHT (HCC): ICD-10-CM

## 2018-07-05 DIAGNOSIS — K76.9 LIVER LESION: Primary | ICD-10-CM

## 2018-07-05 DIAGNOSIS — K76.9 LIVER LESION: ICD-10-CM

## 2018-07-05 LAB
ALK (D5F3) BY IHC RESULT: NEGATIVE
EGFR BY PYROSEQUENCING RESULT: NOT DETECTED
GLUCOSE BLD-MCNC: 128 MG/DL (ref 65–99)
INR BLD: 1 (ref 0.9–1.1)
PSA SERPL DL<=0.01 NG/ML-MCNC: 13.6 SECONDS (ref 12.4–14.7)
ROS1 BY IHC RESULT: NEGATIVE

## 2018-07-05 PROCEDURE — 76942 ECHO GUIDE FOR BIOPSY: CPT | Performed by: INTERNAL MEDICINE

## 2018-07-05 PROCEDURE — 82962 GLUCOSE BLOOD TEST: CPT

## 2018-07-05 PROCEDURE — 99152 MOD SED SAME PHYS/QHP 5/>YRS: CPT | Performed by: INTERNAL MEDICINE

## 2018-07-05 PROCEDURE — 88342 IMHCHEM/IMCYTCHM 1ST ANTB: CPT | Performed by: INTERNAL MEDICINE

## 2018-07-05 PROCEDURE — 88307 TISSUE EXAM BY PATHOLOGIST: CPT | Performed by: INTERNAL MEDICINE

## 2018-07-05 PROCEDURE — 77470 SPECIAL RADIATION TREATMENT: CPT | Performed by: RADIOLOGY

## 2018-07-05 PROCEDURE — 47000 NEEDLE BIOPSY OF LIVER PERQ: CPT | Performed by: INTERNAL MEDICINE

## 2018-07-05 PROCEDURE — 88341 IMHCHEM/IMCYTCHM EA ADD ANTB: CPT | Performed by: INTERNAL MEDICINE

## 2018-07-05 PROCEDURE — 85610 PROTHROMBIN TIME: CPT

## 2018-07-05 RX ORDER — MIDAZOLAM HYDROCHLORIDE 1 MG/ML
INJECTION INTRAMUSCULAR; INTRAVENOUS
Status: COMPLETED
Start: 2018-07-05 | End: 2018-07-05

## 2018-07-05 RX ORDER — FLUMAZENIL 0.1 MG/ML
0.2 INJECTION, SOLUTION INTRAVENOUS AS NEEDED
Status: DISCONTINUED | OUTPATIENT
Start: 2018-07-05 | End: 2018-07-07

## 2018-07-05 RX ORDER — FLUMAZENIL 0.1 MG/ML
INJECTION, SOLUTION INTRAVENOUS
Status: DISCONTINUED
Start: 2018-07-05 | End: 2018-07-05 | Stop reason: WASHOUT

## 2018-07-05 RX ORDER — SODIUM CHLORIDE 9 MG/ML
INJECTION, SOLUTION INTRAVENOUS CONTINUOUS
Status: DISCONTINUED | OUTPATIENT
Start: 2018-07-05 | End: 2018-07-07

## 2018-07-05 RX ORDER — NALOXONE HYDROCHLORIDE 0.4 MG/ML
80 INJECTION, SOLUTION INTRAMUSCULAR; INTRAVENOUS; SUBCUTANEOUS AS NEEDED
Status: DISCONTINUED | OUTPATIENT
Start: 2018-07-05 | End: 2018-07-07

## 2018-07-05 RX ORDER — MIDAZOLAM HYDROCHLORIDE 1 MG/ML
1 INJECTION INTRAMUSCULAR; INTRAVENOUS EVERY 5 MIN PRN
Status: ACTIVE | OUTPATIENT
Start: 2018-07-05 | End: 2018-07-05

## 2018-07-05 RX ORDER — NALOXONE HYDROCHLORIDE 0.4 MG/ML
INJECTION, SOLUTION INTRAMUSCULAR; INTRAVENOUS; SUBCUTANEOUS
Status: DISCONTINUED
Start: 2018-07-05 | End: 2018-07-05 | Stop reason: WASHOUT

## 2018-07-05 RX ADMIN — SODIUM CHLORIDE 10 ML: 9 INJECTION, SOLUTION INTRAVENOUS at 11:05:00

## 2018-07-05 RX ADMIN — MIDAZOLAM HYDROCHLORIDE 1 MG: 1 INJECTION INTRAMUSCULAR; INTRAVENOUS at 11:07:00

## 2018-07-05 RX ADMIN — MIDAZOLAM HYDROCHLORIDE 0.5 MG: 1 INJECTION INTRAMUSCULAR; INTRAVENOUS at 11:10:00

## 2018-07-05 NOTE — IMAGING NOTE
Patient tolerated US guided liver biopsy procedure well, VSS on RA, presently denies pain, Tegaderm dressing to RUQ is CDI.  Patient and wife updated on procedure and plan of care, report called to [facility] RN and patient transported to room 2251 by Samaria Nash

## 2018-07-06 ENCOUNTER — OFFICE VISIT (OUTPATIENT)
Dept: HEMATOLOGY/ONCOLOGY | Facility: HOSPITAL | Age: 73
End: 2018-07-06
Attending: INTERNAL MEDICINE
Payer: COMMERCIAL

## 2018-07-06 DIAGNOSIS — C34.01 MALIGNANT NEOPLASM OF HILUS OF RIGHT LUNG (HCC): Primary | ICD-10-CM

## 2018-07-06 DIAGNOSIS — Z71.9 ENCOUNTER FOR EDUCATION: ICD-10-CM

## 2018-07-06 PROCEDURE — 99215 OFFICE O/P EST HI 40 MIN: CPT | Performed by: CLINICAL NURSE SPECIALIST

## 2018-07-06 NOTE — PROGRESS NOTES
Chemotherapy Education    Learner:  Patient and Spouse    Barriers / Limitations:  None    Chemotherapy education goals:  · Learn the drug names  · Administration schedule  · Routes of administration  · Treatment setting    Drug names:  Cisplatin, Etoposid N/A    Notify MD/RN of any changes or problems:  Achieved    Comments:    Treatment Effects on Emotional Status:    Potential mood changes, depression, nervousness, difficulty sleeping:  Achieved    Importance of support system:  Achieved    Notify MD/RN o

## 2018-07-08 ENCOUNTER — HOSPITAL ENCOUNTER (OUTPATIENT)
Dept: MRI IMAGING | Facility: HOSPITAL | Age: 73
Discharge: HOME | End: 2018-07-08
Attending: INTERNAL MEDICINE
Payer: COMMERCIAL

## 2018-07-08 DIAGNOSIS — C34.90 LUNG CANCER (HCC): ICD-10-CM

## 2018-07-08 PROCEDURE — A9576 INJ PROHANCE MULTIPACK: HCPCS | Performed by: INTERNAL MEDICINE

## 2018-07-08 PROCEDURE — 70553 MRI BRAIN STEM W/O & W/DYE: CPT | Performed by: INTERNAL MEDICINE

## 2018-07-09 ENCOUNTER — OFFICE VISIT (OUTPATIENT)
Dept: HEMATOLOGY/ONCOLOGY | Facility: HOSPITAL | Age: 73
End: 2018-07-09
Attending: INTERNAL MEDICINE
Payer: COMMERCIAL

## 2018-07-09 ENCOUNTER — TELEPHONE (OUTPATIENT)
Dept: HEMATOLOGY/ONCOLOGY | Facility: HOSPITAL | Age: 73
End: 2018-07-09

## 2018-07-09 ENCOUNTER — DIETICIAN VISIT (OUTPATIENT)
Dept: HEMATOLOGY/ONCOLOGY | Facility: HOSPITAL | Age: 73
End: 2018-07-09

## 2018-07-09 VITALS
HEIGHT: 67.01 IN | DIASTOLIC BLOOD PRESSURE: 73 MMHG | WEIGHT: 182 LBS | RESPIRATION RATE: 18 BRPM | SYSTOLIC BLOOD PRESSURE: 176 MMHG | HEART RATE: 66 BPM | OXYGEN SATURATION: 96 % | TEMPERATURE: 98 F | BODY MASS INDEX: 28.56 KG/M2

## 2018-07-09 DIAGNOSIS — C34.01 MALIGNANT NEOPLASM OF HILUS OF RIGHT LUNG (HCC): Primary | ICD-10-CM

## 2018-07-09 LAB
ALBUMIN SERPL-MCNC: 3.4 G/DL (ref 3.5–4.8)
ALP LIVER SERPL-CCNC: 82 U/L (ref 45–117)
ALT SERPL-CCNC: 28 U/L (ref 17–63)
AST SERPL-CCNC: 15 U/L (ref 15–41)
BASOPHILS # BLD AUTO: 0.03 X10(3) UL (ref 0–0.1)
BASOPHILS NFR BLD AUTO: 0.3 %
BILIRUB SERPL-MCNC: 0.2 MG/DL (ref 0.1–2)
BUN BLD-MCNC: 36 MG/DL (ref 8–20)
CALCIUM BLD-MCNC: 8.6 MG/DL (ref 8.3–10.3)
CHLORIDE: 116 MMOL/L (ref 101–111)
CO2: 19 MMOL/L (ref 22–32)
CREAT BLD-MCNC: 1.23 MG/DL (ref 0.7–1.3)
EOSINOPHIL # BLD AUTO: 0.1 X10(3) UL (ref 0–0.3)
EOSINOPHIL NFR BLD AUTO: 0.9 %
ERYTHROCYTE [DISTWIDTH] IN BLOOD BY AUTOMATED COUNT: 12.8 % (ref 11.5–16)
GLUCOSE BLD-MCNC: 135 MG/DL (ref 70–99)
HCT VFR BLD AUTO: 36.1 % (ref 37–53)
HGB BLD-MCNC: 12.1 G/DL (ref 13–17)
IMMATURE GRANULOCYTE COUNT: 0.06 X10(3) UL (ref 0–1)
IMMATURE GRANULOCYTE RATIO %: 0.6 %
LYMPHOCYTES # BLD AUTO: 1.42 X10(3) UL (ref 0.9–4)
LYMPHOCYTES NFR BLD AUTO: 13.1 %
M PROTEIN MFR SERPL ELPH: 7.3 G/DL (ref 6.1–8.3)
MCH RBC QN AUTO: 32.6 PG (ref 27–33.2)
MCHC RBC AUTO-ENTMCNC: 33.5 G/DL (ref 31–37)
MCV RBC AUTO: 97.3 FL (ref 80–99)
MONOCYTES # BLD AUTO: 1.04 X10(3) UL (ref 0.1–1)
MONOCYTES NFR BLD AUTO: 9.6 %
NEUTROPHIL ABS PRELIM: 8.16 X10 (3) UL (ref 1.3–6.7)
NEUTROPHILS # BLD AUTO: 8.16 X10(3) UL (ref 1.3–6.7)
NEUTROPHILS NFR BLD AUTO: 75.5 %
PLATELET # BLD AUTO: 315 10(3)UL (ref 150–450)
POTASSIUM SERPL-SCNC: 4.2 MMOL/L (ref 3.6–5.1)
RBC # BLD AUTO: 3.71 X10(6)UL (ref 3.8–5.8)
RED CELL DISTRIBUTION WIDTH-SD: 45.5 FL (ref 35.1–46.3)
SODIUM SERPL-SCNC: 143 MMOL/L (ref 136–144)
WBC # BLD AUTO: 10.8 X10(3) UL (ref 4–13)

## 2018-07-09 PROCEDURE — 96376 TX/PRO/DX INJ SAME DRUG ADON: CPT

## 2018-07-09 PROCEDURE — 80053 COMPREHEN METABOLIC PANEL: CPT

## 2018-07-09 PROCEDURE — 96367 TX/PROPH/DG ADDL SEQ IV INF: CPT

## 2018-07-09 PROCEDURE — 85025 COMPLETE CBC W/AUTO DIFF WBC: CPT

## 2018-07-09 PROCEDURE — 96361 HYDRATE IV INFUSION ADD-ON: CPT

## 2018-07-09 PROCEDURE — 96366 THER/PROPH/DIAG IV INF ADDON: CPT

## 2018-07-09 PROCEDURE — 96417 CHEMO IV INFUS EACH ADDL SEQ: CPT

## 2018-07-09 PROCEDURE — 77293 RESPIRATOR MOTION MGMT SIMUL: CPT | Performed by: RADIOLOGY

## 2018-07-09 PROCEDURE — 77301 RADIOTHERAPY DOSE PLAN IMRT: CPT | Performed by: RADIOLOGY

## 2018-07-09 PROCEDURE — 96375 TX/PRO/DX INJ NEW DRUG ADDON: CPT

## 2018-07-09 PROCEDURE — 77338 DESIGN MLC DEVICE FOR IMRT: CPT | Performed by: RADIOLOGY

## 2018-07-09 PROCEDURE — 77300 RADIATION THERAPY DOSE PLAN: CPT | Performed by: RADIOLOGY

## 2018-07-09 PROCEDURE — 96413 CHEMO IV INFUSION 1 HR: CPT

## 2018-07-09 RX ORDER — ONDANSETRON 2 MG/ML
8 INJECTION INTRAMUSCULAR; INTRAVENOUS EVERY 6 HOURS PRN
Status: CANCELLED | OUTPATIENT
Start: 2018-07-13

## 2018-07-09 RX ORDER — LORAZEPAM 2 MG/ML
INJECTION INTRAMUSCULAR EVERY 4 HOURS PRN
Status: CANCELLED | OUTPATIENT
Start: 2018-07-09

## 2018-07-09 RX ORDER — PROCHLORPERAZINE MALEATE 10 MG
10 TABLET ORAL EVERY 6 HOURS PRN
Status: CANCELLED | OUTPATIENT
Start: 2018-07-11

## 2018-07-09 RX ORDER — PROCHLORPERAZINE MALEATE 10 MG
10 TABLET ORAL EVERY 6 HOURS PRN
Status: CANCELLED | OUTPATIENT
Start: 2018-07-09

## 2018-07-09 RX ORDER — LORAZEPAM 0.5 MG/1
TABLET ORAL EVERY 4 HOURS PRN
Status: CANCELLED | OUTPATIENT
Start: 2018-07-12

## 2018-07-09 RX ORDER — SODIUM CHLORIDE 9 MG/ML
1000 INJECTION, SOLUTION INTRAVENOUS ONCE
Status: CANCELLED | OUTPATIENT
Start: 2018-07-16

## 2018-07-09 RX ORDER — METOCLOPRAMIDE HYDROCHLORIDE 5 MG/ML
10 INJECTION INTRAMUSCULAR; INTRAVENOUS EVERY 6 HOURS PRN
Status: CANCELLED | OUTPATIENT
Start: 2018-07-10

## 2018-07-09 RX ORDER — METOCLOPRAMIDE HYDROCHLORIDE 5 MG/ML
10 INJECTION INTRAMUSCULAR; INTRAVENOUS EVERY 6 HOURS PRN
Status: CANCELLED | OUTPATIENT
Start: 2018-07-13

## 2018-07-09 RX ORDER — PROCHLORPERAZINE MALEATE 10 MG
10 TABLET ORAL EVERY 6 HOURS PRN
Status: CANCELLED | OUTPATIENT
Start: 2018-07-16

## 2018-07-09 RX ORDER — METOCLOPRAMIDE HYDROCHLORIDE 5 MG/ML
10 INJECTION INTRAMUSCULAR; INTRAVENOUS EVERY 6 HOURS PRN
Status: CANCELLED | OUTPATIENT
Start: 2018-07-12

## 2018-07-09 RX ORDER — LORAZEPAM 0.5 MG/1
TABLET ORAL EVERY 4 HOURS PRN
Status: CANCELLED | OUTPATIENT
Start: 2018-07-16

## 2018-07-09 RX ORDER — PROCHLORPERAZINE MALEATE 10 MG
10 TABLET ORAL EVERY 6 HOURS PRN
Status: CANCELLED | OUTPATIENT
Start: 2018-07-12

## 2018-07-09 RX ORDER — METOCLOPRAMIDE HYDROCHLORIDE 5 MG/ML
10 INJECTION INTRAMUSCULAR; INTRAVENOUS EVERY 6 HOURS PRN
Status: CANCELLED | OUTPATIENT
Start: 2018-07-09

## 2018-07-09 RX ORDER — ONDANSETRON 2 MG/ML
8 INJECTION INTRAMUSCULAR; INTRAVENOUS EVERY 6 HOURS PRN
Status: CANCELLED | OUTPATIENT
Start: 2018-07-11

## 2018-07-09 RX ORDER — METOCLOPRAMIDE HYDROCHLORIDE 5 MG/ML
10 INJECTION INTRAMUSCULAR; INTRAVENOUS EVERY 6 HOURS PRN
Status: CANCELLED | OUTPATIENT
Start: 2018-07-11

## 2018-07-09 RX ORDER — LORAZEPAM 0.5 MG/1
TABLET ORAL EVERY 4 HOURS PRN
Status: CANCELLED | OUTPATIENT
Start: 2018-07-10

## 2018-07-09 RX ORDER — METOCLOPRAMIDE HYDROCHLORIDE 5 MG/ML
10 INJECTION INTRAMUSCULAR; INTRAVENOUS EVERY 6 HOURS PRN
Status: CANCELLED | OUTPATIENT
Start: 2018-07-16

## 2018-07-09 RX ORDER — PROCHLORPERAZINE MALEATE 10 MG
10 TABLET ORAL EVERY 6 HOURS PRN
Status: CANCELLED | OUTPATIENT
Start: 2018-07-13

## 2018-07-09 RX ORDER — ONDANSETRON 2 MG/ML
8 INJECTION INTRAMUSCULAR; INTRAVENOUS EVERY 6 HOURS PRN
Status: CANCELLED | OUTPATIENT
Start: 2018-07-10

## 2018-07-09 RX ORDER — LORAZEPAM 2 MG/ML
INJECTION INTRAMUSCULAR EVERY 4 HOURS PRN
Status: CANCELLED | OUTPATIENT
Start: 2018-07-16

## 2018-07-09 RX ORDER — LORAZEPAM 0.5 MG/1
TABLET ORAL EVERY 4 HOURS PRN
Status: CANCELLED | OUTPATIENT
Start: 2018-07-11

## 2018-07-09 RX ORDER — ONDANSETRON 2 MG/ML
8 INJECTION INTRAMUSCULAR; INTRAVENOUS EVERY 6 HOURS PRN
Status: CANCELLED | OUTPATIENT
Start: 2018-07-16

## 2018-07-09 RX ORDER — LORAZEPAM 2 MG/ML
INJECTION INTRAMUSCULAR EVERY 4 HOURS PRN
Status: CANCELLED | OUTPATIENT
Start: 2018-07-13

## 2018-07-09 RX ORDER — LORAZEPAM 0.5 MG/1
TABLET ORAL EVERY 4 HOURS PRN
Status: CANCELLED | OUTPATIENT
Start: 2018-07-13

## 2018-07-09 RX ORDER — LORAZEPAM 2 MG/ML
INJECTION INTRAMUSCULAR EVERY 4 HOURS PRN
Status: CANCELLED | OUTPATIENT
Start: 2018-07-12

## 2018-07-09 RX ORDER — ONDANSETRON 2 MG/ML
8 INJECTION INTRAMUSCULAR; INTRAVENOUS EVERY 6 HOURS PRN
Status: CANCELLED | OUTPATIENT
Start: 2018-07-09

## 2018-07-09 RX ORDER — LORAZEPAM 2 MG/ML
INJECTION INTRAMUSCULAR EVERY 4 HOURS PRN
Status: CANCELLED | OUTPATIENT
Start: 2018-07-10

## 2018-07-09 RX ORDER — PROCHLORPERAZINE MALEATE 10 MG
10 TABLET ORAL EVERY 6 HOURS PRN
Status: CANCELLED | OUTPATIENT
Start: 2018-07-10

## 2018-07-09 RX ORDER — ONDANSETRON 2 MG/ML
8 INJECTION INTRAMUSCULAR; INTRAVENOUS EVERY 6 HOURS PRN
Status: CANCELLED | OUTPATIENT
Start: 2018-07-12

## 2018-07-09 RX ORDER — LORAZEPAM 0.5 MG/1
TABLET ORAL EVERY 4 HOURS PRN
Status: CANCELLED | OUTPATIENT
Start: 2018-07-09

## 2018-07-09 RX ORDER — LORAZEPAM 2 MG/ML
INJECTION INTRAMUSCULAR EVERY 4 HOURS PRN
Status: CANCELLED | OUTPATIENT
Start: 2018-07-11

## 2018-07-09 RX ORDER — SODIUM CHLORIDE 9 MG/ML
1000 INJECTION, SOLUTION INTRAVENOUS ONCE
Status: COMPLETED | OUTPATIENT
Start: 2018-07-09 | End: 2018-07-09

## 2018-07-09 RX ADMIN — SODIUM CHLORIDE 1000 ML: 9 INJECTION, SOLUTION INTRAVENOUS at 14:19:00

## 2018-07-09 NOTE — PATIENT INSTRUCTIONS
[facility] Chemo Education Overview    Learner:  Patient and Spouse    Learner's Barriers / Limitations of Education:  None    Names of Drugs:      Chemotherapy:Cisplatin/Etoposide           Anti-Nausea:    Prochlorperazine (Compazine) 10 mg tabl

## 2018-07-09 NOTE — PROGRESS NOTES
Pt here for C1D1.   Arrives Ambulating independently, accompanied by Spouse           Modifications in dose or schedule: No     Frequency of blood return and site check throughout administration: Prior to administration, Prior to each drug and At completion

## 2018-07-09 NOTE — PROGRESS NOTES
Nutrition screen complete as triggered by [facility] dx of stage III lung cancer. Chart reviewed. Noted pt to begin concurrent chemoRT; noted potential for esophagitis. Pt appears at a moderate to hihg nutrition risk at present.  RD will plan to meet w/

## 2018-07-10 ENCOUNTER — OFFICE VISIT (OUTPATIENT)
Dept: HEMATOLOGY/ONCOLOGY | Facility: HOSPITAL | Age: 73
End: 2018-07-10
Attending: INTERNAL MEDICINE
Payer: COMMERCIAL

## 2018-07-10 ENCOUNTER — TELEPHONE (OUTPATIENT)
Dept: HEMATOLOGY/ONCOLOGY | Facility: HOSPITAL | Age: 73
End: 2018-07-10

## 2018-07-10 VITALS
RESPIRATION RATE: 20 BRPM | OXYGEN SATURATION: 96 % | DIASTOLIC BLOOD PRESSURE: 71 MMHG | HEART RATE: 54 BPM | SYSTOLIC BLOOD PRESSURE: 185 MMHG | TEMPERATURE: 99 F

## 2018-07-10 DIAGNOSIS — C34.01 MALIGNANT NEOPLASM OF HILUS OF RIGHT LUNG (HCC): Primary | ICD-10-CM

## 2018-07-10 PROCEDURE — 77300 RADIATION THERAPY DOSE PLAN: CPT | Performed by: RADIOLOGY

## 2018-07-10 PROCEDURE — 96375 TX/PRO/DX INJ NEW DRUG ADDON: CPT

## 2018-07-10 PROCEDURE — 77334 RADIATION TREATMENT AID(S): CPT | Performed by: RADIOLOGY

## 2018-07-10 PROCEDURE — 77295 3-D RADIOTHERAPY PLAN: CPT | Performed by: RADIOLOGY

## 2018-07-10 PROCEDURE — 96409 CHEMO IV PUSH SNGL DRUG: CPT

## 2018-07-10 PROCEDURE — 77293 RESPIRATOR MOTION MGMT SIMUL: CPT | Performed by: RADIOLOGY

## 2018-07-10 NOTE — TELEPHONE ENCOUNTER
Date of Treatment: 7/9/18                                Type of Chemo: Cisplat. RT    Comments: Pt spoke with MD today and see in [address] for D2 chemo. See notes. Recommendations: see notes.

## 2018-07-10 NOTE — TELEPHONE ENCOUNTER
Spoke to  Julieta Florinda and discussed liver biopsy results. Unfortunately this showed metastasis to liver. Had discussed with Jaycee Marquez.  As liver lesion is an oligo met will plan to continue current plan of chemoRT to be followed by SBRT to liver l

## 2018-07-11 ENCOUNTER — OFFICE VISIT (OUTPATIENT)
Dept: HEMATOLOGY/ONCOLOGY | Facility: HOSPITAL | Age: 73
End: 2018-07-11
Attending: INTERNAL MEDICINE
Payer: COMMERCIAL

## 2018-07-11 ENCOUNTER — HOSPITAL ENCOUNTER (OUTPATIENT)
Dept: RADIATION ONCOLOGY | Facility: HOSPITAL | Age: 73
Discharge: HOME | End: 2018-07-11
Attending: RADIOLOGY
Payer: COMMERCIAL

## 2018-07-11 VITALS
TEMPERATURE: 98 F | OXYGEN SATURATION: 96 % | RESPIRATION RATE: 18 BRPM | SYSTOLIC BLOOD PRESSURE: 168 MMHG | DIASTOLIC BLOOD PRESSURE: 78 MMHG | HEART RATE: 56 BPM

## 2018-07-11 DIAGNOSIS — C34.01 MALIGNANT NEOPLASM OF HILUS OF RIGHT LUNG (HCC): Primary | ICD-10-CM

## 2018-07-11 PROCEDURE — 77280 THER RAD SIMULAJ FIELD SMPL: CPT | Performed by: RADIOLOGY

## 2018-07-11 PROCEDURE — 96413 CHEMO IV INFUSION 1 HR: CPT

## 2018-07-11 PROCEDURE — 77412 RADIATION TX DELIVERY LVL 3: CPT | Performed by: RADIOLOGY

## 2018-07-11 PROCEDURE — 96375 TX/PRO/DX INJ NEW DRUG ADDON: CPT

## 2018-07-11 NOTE — PROGRESS NOTES
Pt here for C1D2.   Arrives Ambulating independently, accompanied by Other self           Modifications in dose or schedule: No      Frequency of blood return and site check throughout administration: Prior to administration and At completion of therapy   D

## 2018-07-12 ENCOUNTER — OFFICE VISIT (OUTPATIENT)
Dept: HEMATOLOGY/ONCOLOGY | Facility: HOSPITAL | Age: 73
End: 2018-07-12
Attending: INTERNAL MEDICINE
Payer: COMMERCIAL

## 2018-07-12 ENCOUNTER — HOSPITAL ENCOUNTER (OUTPATIENT)
Dept: CV DIAGNOSTICS | Facility: HOSPITAL | Age: 73
Discharge: HOME | End: 2018-07-12
Attending: INTERNAL MEDICINE
Payer: COMMERCIAL

## 2018-07-12 VITALS
TEMPERATURE: 98 F | BODY MASS INDEX: 29.51 KG/M2 | WEIGHT: 188 LBS | RESPIRATION RATE: 20 BRPM | DIASTOLIC BLOOD PRESSURE: 78 MMHG | SYSTOLIC BLOOD PRESSURE: 188 MMHG | OXYGEN SATURATION: 97 % | HEIGHT: 67.01 IN | HEART RATE: 46 BPM

## 2018-07-12 DIAGNOSIS — C34.01 MALIGNANT NEOPLASM OF HILUS OF RIGHT LUNG (HCC): Primary | ICD-10-CM

## 2018-07-12 DIAGNOSIS — Z51.11 ENCOUNTER FOR ANTINEOPLASTIC CHEMOTHERAPY: ICD-10-CM

## 2018-07-12 PROCEDURE — 93306 TTE W/DOPPLER COMPLETE: CPT | Performed by: INTERNAL MEDICINE

## 2018-07-12 PROCEDURE — 96375 TX/PRO/DX INJ NEW DRUG ADDON: CPT

## 2018-07-12 PROCEDURE — 96413 CHEMO IV INFUSION 1 HR: CPT

## 2018-07-12 PROCEDURE — 77387 GUIDANCE FOR RADJ TX DLVR: CPT | Performed by: RADIOLOGY

## 2018-07-12 PROCEDURE — 77412 RADIATION TX DELIVERY LVL 3: CPT | Performed by: RADIOLOGY

## 2018-07-12 RX ORDER — FUROSEMIDE 10 MG/ML
20 INJECTION INTRAMUSCULAR; INTRAVENOUS ONCE
Status: COMPLETED | OUTPATIENT
Start: 2018-07-12 | End: 2018-07-12

## 2018-07-12 RX ADMIN — FUROSEMIDE 20 MG: 10 INJECTION INTRAMUSCULAR; INTRAVENOUS at 09:58:00

## 2018-07-12 NOTE — PROGRESS NOTES
Pt here for C1D4 CISPLATIN/ETOP. Arrives Ambulating independently, by himself.     BP reading of 224/88 with automatic cuff; 220/80 with manual. Pt reports that he takes enalapril 10 mg once daily between 2-4 pm. Reports increased OWEN since starting tx al

## 2018-07-13 ENCOUNTER — OFFICE VISIT (OUTPATIENT)
Dept: HEMATOLOGY/ONCOLOGY | Facility: HOSPITAL | Age: 73
End: 2018-07-13
Attending: INTERNAL MEDICINE
Payer: COMMERCIAL

## 2018-07-13 VITALS
HEART RATE: 60 BPM | SYSTOLIC BLOOD PRESSURE: 132 MMHG | DIASTOLIC BLOOD PRESSURE: 63 MMHG | OXYGEN SATURATION: 97 % | RESPIRATION RATE: 20 BRPM | TEMPERATURE: 98 F

## 2018-07-13 DIAGNOSIS — C34.01 MALIGNANT NEOPLASM OF HILUS OF RIGHT LUNG (HCC): Primary | ICD-10-CM

## 2018-07-13 PROCEDURE — 77387 GUIDANCE FOR RADJ TX DLVR: CPT | Performed by: RADIOLOGY

## 2018-07-13 PROCEDURE — 77331 SPECIAL RADIATION DOSIMETRY: CPT | Performed by: RADIOLOGY

## 2018-07-13 PROCEDURE — 77412 RADIATION TX DELIVERY LVL 3: CPT | Performed by: RADIOLOGY

## 2018-07-13 PROCEDURE — 96375 TX/PRO/DX INJ NEW DRUG ADDON: CPT

## 2018-07-13 PROCEDURE — 96413 CHEMO IV INFUSION 1 HR: CPT

## 2018-07-13 NOTE — PROGRESS NOTES
Pt here for C1D5 - Etoposide for lung CA.   Arrives Ambulating independently, accompanied by Other N/A           **Patient requested to keep IV in from 7/9 as long as possible; upon arrival, IV was flushing fine, able to get back some blood return still and

## 2018-07-13 NOTE — PROGRESS NOTES
Nutrition Consultation    Patient Name: Kaylyn Weston  YOB: 1945  Medical Record Number: [id number]   Account Number: [de-identified]  Dietitian: Mitchell Ceja RD    Date of visit: 7/13/2018    Diet Rx: high protein, soft diet    Pertinent D daily. 162.5mg , Disp: , Rfl:   •  DiphenhydrAMINE HCl (BENADRYL ALLERGY) 25 MG Oral Cap, Please take 2 tablets 1 hour prior to scheduled scan., Disp: 2 capsule, Rfl: 1  •  Albuterol Sulfate (PROVENTIL HFA IN), Inhale into the lungs 2 (two) times daily. , D intake for my diabetes. \" Pt noted not being interested in nutritional supplements.        Thank you for allowing me to participate in the care of

## 2018-07-16 ENCOUNTER — HOSPITAL ENCOUNTER (OUTPATIENT)
Dept: RADIATION ONCOLOGY | Facility: HOSPITAL | Age: 73
Discharge: HOME | End: 2018-07-16
Attending: RADIOLOGY
Payer: COMMERCIAL

## 2018-07-16 ENCOUNTER — OFFICE VISIT (OUTPATIENT)
Dept: HEMATOLOGY/ONCOLOGY | Facility: HOSPITAL | Age: 73
End: 2018-07-16
Attending: INTERNAL MEDICINE
Payer: COMMERCIAL

## 2018-07-16 VITALS
WEIGHT: 178.5 LBS | DIASTOLIC BLOOD PRESSURE: 64 MMHG | OXYGEN SATURATION: 98 % | RESPIRATION RATE: 18 BRPM | SYSTOLIC BLOOD PRESSURE: 158 MMHG | BODY MASS INDEX: 28 KG/M2 | HEART RATE: 74 BPM

## 2018-07-16 VITALS
OXYGEN SATURATION: 94 % | BODY MASS INDEX: 28.17 KG/M2 | HEIGHT: 67.01 IN | DIASTOLIC BLOOD PRESSURE: 69 MMHG | RESPIRATION RATE: 20 BRPM | SYSTOLIC BLOOD PRESSURE: 160 MMHG | TEMPERATURE: 98 F | WEIGHT: 179.5 LBS | HEART RATE: 69 BPM

## 2018-07-16 DIAGNOSIS — C34.01 MALIGNANT NEOPLASM OF HILUS OF RIGHT LUNG (HCC): Primary | ICD-10-CM

## 2018-07-16 DIAGNOSIS — K59.03 DRUG-INDUCED CONSTIPATION: ICD-10-CM

## 2018-07-16 DIAGNOSIS — Z51.11 ENCOUNTER FOR CHEMOTHERAPY MANAGEMENT: ICD-10-CM

## 2018-07-16 DIAGNOSIS — C34.31 MALIGNANT NEOPLASM OF LOWER LOBE OF RIGHT LUNG (HCC): Primary | ICD-10-CM

## 2018-07-16 DIAGNOSIS — C78.7 LIVER METASTASIS (HCC): ICD-10-CM

## 2018-07-16 LAB
BASOPHILS # BLD AUTO: 0.01 X10(3) UL (ref 0–0.1)
BASOPHILS NFR BLD AUTO: 0.1 %
EOSINOPHIL # BLD AUTO: 0.56 X10(3) UL (ref 0–0.3)
EOSINOPHIL NFR BLD AUTO: 5.7 %
ERYTHROCYTE [DISTWIDTH] IN BLOOD BY AUTOMATED COUNT: 12.4 % (ref 11.5–16)
HCT VFR BLD AUTO: 39.7 % (ref 37–53)
HGB BLD-MCNC: 13.6 G/DL (ref 13–17)
IMMATURE GRANULOCYTE COUNT: 0.09 X10(3) UL (ref 0–1)
IMMATURE GRANULOCYTE RATIO %: 0.9 %
LYMPHOCYTES # BLD AUTO: 1.14 X10(3) UL (ref 0.9–4)
LYMPHOCYTES NFR BLD AUTO: 11.7 %
MCH RBC QN AUTO: 32.9 PG (ref 27–33.2)
MCHC RBC AUTO-ENTMCNC: 34.3 G/DL (ref 31–37)
MCV RBC AUTO: 96.1 FL (ref 80–99)
MONOCYTES # BLD AUTO: 0.11 X10(3) UL (ref 0.1–1)
MONOCYTES NFR BLD AUTO: 1.1 %
NEUTROPHIL ABS PRELIM: 7.85 X10 (3) UL (ref 1.3–6.7)
NEUTROPHILS # BLD AUTO: 7.85 X10(3) UL (ref 1.3–6.7)
NEUTROPHILS NFR BLD AUTO: 80.5 %
PLATELET # BLD AUTO: 335 10(3)UL (ref 150–450)
RBC # BLD AUTO: 4.13 X10(6)UL (ref 3.8–5.8)
RED CELL DISTRIBUTION WIDTH-SD: 43.8 FL (ref 35.1–46.3)
WBC # BLD AUTO: 9.8 X10(3) UL (ref 4–13)

## 2018-07-16 PROCEDURE — 96367 TX/PROPH/DG ADDL SEQ IV INF: CPT

## 2018-07-16 PROCEDURE — 96375 TX/PRO/DX INJ NEW DRUG ADDON: CPT

## 2018-07-16 PROCEDURE — 96366 THER/PROPH/DIAG IV INF ADDON: CPT

## 2018-07-16 PROCEDURE — 96376 TX/PRO/DX INJ SAME DRUG ADON: CPT

## 2018-07-16 PROCEDURE — 99214 OFFICE O/P EST MOD 30 MIN: CPT | Performed by: CLINICAL NURSE SPECIALIST

## 2018-07-16 PROCEDURE — 85025 COMPLETE CBC W/AUTO DIFF WBC: CPT

## 2018-07-16 PROCEDURE — 77387 GUIDANCE FOR RADJ TX DLVR: CPT | Performed by: RADIOLOGY

## 2018-07-16 PROCEDURE — 96413 CHEMO IV INFUSION 1 HR: CPT

## 2018-07-16 PROCEDURE — 77412 RADIATION TX DELIVERY LVL 3: CPT | Performed by: RADIOLOGY

## 2018-07-16 RX ORDER — PREDNISONE 20 MG/1
TABLET ORAL
Refills: 1 | COMMUNITY
Start: 2018-06-27 | End: 2018-07-18 | Stop reason: ALTCHOICE

## 2018-07-16 RX ORDER — ONDANSETRON 8 MG/1
TABLET, ORALLY DISINTEGRATING ORAL
Refills: 3 | COMMUNITY
Start: 2018-06-29 | End: 2018-07-16

## 2018-07-16 RX ORDER — ROSUVASTATIN CALCIUM 40 MG/1
TABLET, COATED ORAL
Refills: 5 | COMMUNITY
Start: 2018-07-05 | End: 2019-01-01

## 2018-07-16 RX ORDER — SODIUM CHLORIDE 9 MG/ML
1000 INJECTION, SOLUTION INTRAVENOUS ONCE
Status: COMPLETED | OUTPATIENT
Start: 2018-07-16 | End: 2018-07-16

## 2018-07-16 RX ORDER — AZITHROMYCIN 250 MG/1
TABLET, FILM COATED ORAL
Refills: 0 | COMMUNITY
Start: 2018-06-27 | End: 2018-07-18 | Stop reason: ALTCHOICE

## 2018-07-16 RX ADMIN — SODIUM CHLORIDE 1000 ML: 9 INJECTION, SOLUTION INTRAVENOUS at 13:49:00

## 2018-07-16 NOTE — PROGRESS NOTES
Education Record    Learner:  Patient    Disease / Diagnosis:  Metastatic lung cancer    Barriers / Limitations:  None   Comments:    Method:  Brief focused   Comments:    General Topics:  Medication, Procedure, Side effects and symptom management and Plan

## 2018-07-16 NOTE — PROGRESS NOTES
[facility] Radiation Treatment Management Note 1-5    Patient:  Doug Soliz  Age:  71year old  Visit Diagnosis:    1.  Malignant neoplasm of lower lobe of right lung [facility])      Primary Rad/Onc:  Dr. Shana Phillip    [facility] [place]

## 2018-07-16 NOTE — PROGRESS NOTES
Education Record    Learner:  Patient    Disease / Diagnosis:  Lung cancer    Barriers / Limitations:  None   Comments:    Method:  Brief focused   Comments:    General Topics:  Medication, Procedure, Side effects and symptom management and Plan of care re

## 2018-07-16 NOTE — PROGRESS NOTES
ANP Visit Note    Patient Name: Torsten Guerrier   YOB: 1945   Medical Record Number: [id number]   CSN: [id number]   Date of visit: 7/16/2018       Chief Complaint/Reason for Visit:  Lung Cancer with oligo liver mets, chemotherapy     Oncologic disease) ([facility]     Microalbuminuria due to type 2 diabetes mellitus ([facility]     S/P carotid endarterectomy     [facility]     Umbilical hernia without obstruction and without gangrene     Former smoker     Nuclear sclerotic catarac bicep surgery-might have                pins-not certain    Allergies:     Iodides [Radiology *    ANAPHYLAXIS    Comment:States extreme HTN after iodine contrast. Pt             states he almost   Pcns [Penicillins]      UNKNOWN    Comment:childhood  P 162.5 mg by mouth daily. Takes half a tablet daily.  162.5mg , Disp: , Rfl:   •  DiphenhydrAMINE HCl (BENADRYL ALLERGY) 25 MG Oral Cap, Please take 2 tablets 1 hour prior to scheduled scan., Disp: 2 capsule, Rfl: 1  •  Albuterol Sulfate (PROVENTIL HFA IN), non tender with good bowel sounds. Extremities: Pedal pulses are present. No edema. Neurological: Grossly intact. Lymphatics: There is no palpable lymphadenopathy throughout in the cervical,supraclavicular, axillary, or inguinal regions.   Psych/Depress patient's emotional well-being and any concerns about anxiety or depression. We discussed issues of distress, coping difficulties and social support systems and currently there are no active problems.     Planned Follow Up: Weekly CBC, 3 weeks for Cycle 2

## 2018-07-17 PROCEDURE — 77387 GUIDANCE FOR RADJ TX DLVR: CPT | Performed by: RADIOLOGY

## 2018-07-17 PROCEDURE — 77412 RADIATION TX DELIVERY LVL 3: CPT | Performed by: RADIOLOGY

## 2018-07-18 PROCEDURE — 77387 GUIDANCE FOR RADJ TX DLVR: CPT | Performed by: RADIOLOGY

## 2018-07-18 PROCEDURE — 77412 RADIATION TX DELIVERY LVL 3: CPT | Performed by: RADIOLOGY

## 2018-07-19 ENCOUNTER — TELEPHONE (OUTPATIENT)
Dept: FAMILY MEDICINE CLINIC | Facility: CLINIC | Age: 73
End: 2018-07-19

## 2018-07-19 PROCEDURE — 77387 GUIDANCE FOR RADJ TX DLVR: CPT | Performed by: RADIOLOGY

## 2018-07-19 PROCEDURE — 77412 RADIATION TX DELIVERY LVL 3: CPT | Performed by: RADIOLOGY

## 2018-07-19 RX ORDER — ENALAPRIL MALEATE 10 MG/1
10 TABLET ORAL 2 TIMES DAILY
Qty: 180 TABLET | Refills: 0 | Status: SHIPPED | OUTPATIENT
Start: 2018-07-19 | End: 2018-07-30

## 2018-07-19 NOTE — TELEPHONE ENCOUNTER
Pt called to report that his BP has been running higher than usual for two weeks. Pt thinks it may be r/t Chemo and/or Steroids he was prescribed by Oncology. They are aware of elevated BP, but Pt was directed to PCP by them.  'N- 992'N systolic, and

## 2018-07-19 NOTE — TELEPHONE ENCOUNTER
Pt has two concerns: Since yesterday Pt has been running high blood pressure and when he takes it the left arm reads a systollic number that is 79-34 points higher than in the left arm.   He has been averaging 190-210/80 in the left arm and 170-180/80 in ri

## 2018-07-19 NOTE — TELEPHONE ENCOUNTER
Likely due to steroids, but ok to increase enalapril to 10 BID from daily.  Sent to local pharmacy    Please notify:      Signed Prescriptions Disp Refills    Enalapril Maleate 10 MG Oral Tab 180 tablet 0      Sig: Take 1 tablet (10 mg total) by mouth 2 (tw

## 2018-07-19 NOTE — TELEPHONE ENCOUNTER
Pt notified. Pt states he is going to do 20 mg once daily as he does not want to takes meds twice daily. Will let Dr Angel Teran know tomorrow.

## 2018-07-20 ENCOUNTER — TELEPHONE (OUTPATIENT)
Dept: HEMATOLOGY/ONCOLOGY | Facility: HOSPITAL | Age: 73
End: 2018-07-20

## 2018-07-20 PROCEDURE — 77336 RADIATION PHYSICS CONSULT: CPT | Performed by: RADIOLOGY

## 2018-07-20 PROCEDURE — 77412 RADIATION TX DELIVERY LVL 3: CPT | Performed by: RADIOLOGY

## 2018-07-20 PROCEDURE — 77387 GUIDANCE FOR RADJ TX DLVR: CPT | Performed by: RADIOLOGY

## 2018-07-20 NOTE — TELEPHONE ENCOUNTER
I agree. Want to increase meds to cover the BP  Continue to check through the weekend. Rest, stay hydrated.

## 2018-07-20 NOTE — TELEPHONE ENCOUNTER
Patient notified. He will track BP readings over the weekend and call on Monday 7/23 with results. Patient verbalized understanding and agrees with plan.

## 2018-07-23 ENCOUNTER — HOSPITAL ENCOUNTER (OUTPATIENT)
Dept: RADIATION ONCOLOGY | Facility: HOSPITAL | Age: 73
End: 2018-07-23
Attending: RADIOLOGY
Payer: COMMERCIAL

## 2018-07-23 ENCOUNTER — TELEPHONE (OUTPATIENT)
Dept: FAMILY MEDICINE CLINIC | Facility: CLINIC | Age: 73
End: 2018-07-23

## 2018-07-23 ENCOUNTER — HOSPITAL ENCOUNTER (OUTPATIENT)
Dept: RADIATION ONCOLOGY | Facility: HOSPITAL | Age: 73
Discharge: HOME | End: 2018-07-23
Attending: RADIOLOGY
Payer: COMMERCIAL

## 2018-07-23 VITALS
SYSTOLIC BLOOD PRESSURE: 155 MMHG | BODY MASS INDEX: 28 KG/M2 | HEART RATE: 76 BPM | RESPIRATION RATE: 17 BRPM | WEIGHT: 177.63 LBS | OXYGEN SATURATION: 98 % | DIASTOLIC BLOOD PRESSURE: 72 MMHG

## 2018-07-23 DIAGNOSIS — C34.31 MALIGNANT NEOPLASM OF LOWER LOBE OF RIGHT LUNG (HCC): Primary | ICD-10-CM

## 2018-07-23 PROCEDURE — 77412 RADIATION TX DELIVERY LVL 3: CPT | Performed by: RADIOLOGY

## 2018-07-23 PROCEDURE — 77387 GUIDANCE FOR RADJ TX DLVR: CPT | Performed by: RADIOLOGY

## 2018-07-23 NOTE — TELEPHONE ENCOUNTER
Pt phoned in recent BP as advised. Readings listed below:  Routed to DR Balwinder Antony. Enalapril was changed from once a day to twice daily last week. Routed to Dr Balwinder Antony.

## 2018-07-23 NOTE — TELEPHONE ENCOUNTER
Up and down BPs. I think I would keep the dose steady where it is - at 20mg (2 of the 10mg). Some BPs controlled, some just a bit high    Thanks for the readings.

## 2018-07-23 NOTE — TELEPHONE ENCOUNTER
Patient was advised to contact our office with blood pressure readings.      July 20, 2018    7am, 150/70/60  July 21, 2018   12:40pm, 119/66/86  July 22, 2018   2:30pm, 127/62/82  July 23,2018   10:15am, 152/62/69

## 2018-07-23 NOTE — PROGRESS NOTES
[facility] Radiation Treatment Management Note 6-10    Patient:  Johana Rucker  Age:  71year old  Visit Diagnosis:    1.  Malignant neoplasm of lower lobe of right lung [facility])      Primary Rad/Onc:  Dr. Tom Nest    Site Delivered

## 2018-07-24 ENCOUNTER — APPOINTMENT (OUTPATIENT)
Dept: HEMATOLOGY/ONCOLOGY | Facility: HOSPITAL | Age: 73
End: 2018-07-24
Attending: INTERNAL MEDICINE
Payer: COMMERCIAL

## 2018-07-24 ENCOUNTER — APPOINTMENT (OUTPATIENT)
Dept: RADIATION ONCOLOGY | Facility: HOSPITAL | Age: 73
End: 2018-07-24
Attending: RADIOLOGY
Payer: COMMERCIAL

## 2018-07-24 PROCEDURE — 77412 RADIATION TX DELIVERY LVL 3: CPT | Performed by: RADIOLOGY

## 2018-07-24 PROCEDURE — 77387 GUIDANCE FOR RADJ TX DLVR: CPT | Performed by: RADIOLOGY

## 2018-07-25 PROCEDURE — 77412 RADIATION TX DELIVERY LVL 3: CPT | Performed by: RADIOLOGY

## 2018-07-25 PROCEDURE — 77387 GUIDANCE FOR RADJ TX DLVR: CPT | Performed by: RADIOLOGY

## 2018-07-26 PROCEDURE — 77387 GUIDANCE FOR RADJ TX DLVR: CPT | Performed by: RADIOLOGY

## 2018-07-26 PROCEDURE — 77412 RADIATION TX DELIVERY LVL 3: CPT | Performed by: RADIOLOGY

## 2018-07-27 PROCEDURE — 77412 RADIATION TX DELIVERY LVL 3: CPT | Performed by: RADIOLOGY

## 2018-07-27 PROCEDURE — 77336 RADIATION PHYSICS CONSULT: CPT | Performed by: RADIOLOGY

## 2018-07-27 PROCEDURE — 77387 GUIDANCE FOR RADJ TX DLVR: CPT | Performed by: RADIOLOGY

## 2018-07-30 ENCOUNTER — HOSPITAL ENCOUNTER (OUTPATIENT)
Dept: RADIATION ONCOLOGY | Facility: HOSPITAL | Age: 73
Discharge: HOME | End: 2018-07-30
Attending: RADIOLOGY
Payer: COMMERCIAL

## 2018-07-30 ENCOUNTER — OFFICE VISIT (OUTPATIENT)
Dept: FAMILY MEDICINE CLINIC | Facility: CLINIC | Age: 73
End: 2018-07-30
Payer: COMMERCIAL

## 2018-07-30 VITALS
DIASTOLIC BLOOD PRESSURE: 80 MMHG | BODY MASS INDEX: 27.78 KG/M2 | RESPIRATION RATE: 16 BRPM | SYSTOLIC BLOOD PRESSURE: 160 MMHG | HEART RATE: 68 BPM | HEIGHT: 67 IN | TEMPERATURE: 99 F | WEIGHT: 177 LBS

## 2018-07-30 VITALS
HEART RATE: 89 BPM | DIASTOLIC BLOOD PRESSURE: 76 MMHG | BODY MASS INDEX: 28 KG/M2 | WEIGHT: 176.31 LBS | SYSTOLIC BLOOD PRESSURE: 146 MMHG | OXYGEN SATURATION: 98 % | RESPIRATION RATE: 17 BRPM | TEMPERATURE: 98 F

## 2018-07-30 DIAGNOSIS — R80.9 MICROALBUMINURIA DUE TO TYPE 2 DIABETES MELLITUS (HCC): ICD-10-CM

## 2018-07-30 DIAGNOSIS — Z00.00 ANNUAL PHYSICAL EXAM: Primary | ICD-10-CM

## 2018-07-30 DIAGNOSIS — N18.2 TYPE 2 DIABETES MELLITUS WITH STAGE 2 CHRONIC KIDNEY DISEASE, WITHOUT LONG-TERM CURRENT USE OF INSULIN (HCC): ICD-10-CM

## 2018-07-30 DIAGNOSIS — E11.22 TYPE 2 DIABETES MELLITUS WITH STAGE 2 CHRONIC KIDNEY DISEASE, WITHOUT LONG-TERM CURRENT USE OF INSULIN (HCC): ICD-10-CM

## 2018-07-30 DIAGNOSIS — C34.31 MALIGNANT NEOPLASM OF LOWER LOBE OF RIGHT LUNG (HCC): Primary | ICD-10-CM

## 2018-07-30 DIAGNOSIS — E11.29 MICROALBUMINURIA DUE TO TYPE 2 DIABETES MELLITUS (HCC): ICD-10-CM

## 2018-07-30 DIAGNOSIS — C34.91 ADENOCARCINOMA OF LUNG, STAGE 4, RIGHT (HCC): ICD-10-CM

## 2018-07-30 DIAGNOSIS — I73.9 PAD (PERIPHERAL ARTERY DISEASE) (HCC): ICD-10-CM

## 2018-07-30 DIAGNOSIS — I10 ESSENTIAL HYPERTENSION, BENIGN: ICD-10-CM

## 2018-07-30 DIAGNOSIS — I70.219 ATHEROSCLEROSIS OF ARTERY OF EXTREMITY WITH INTERMITTENT CLAUDICATION (HCC): ICD-10-CM

## 2018-07-30 DIAGNOSIS — J44.9 CHRONIC OBSTRUCTIVE PULMONARY DISEASE, UNSPECIFIED COPD TYPE (HCC): ICD-10-CM

## 2018-07-30 LAB
CREAT UR-SCNC: 57 MG/DL
MICROALBUMIN UR-MCNC: 8.85 MG/DL
MICROALBUMIN/CREAT 24H UR-RTO: 155.3 UG/MG (ref ?–30)

## 2018-07-30 PROCEDURE — 77387 GUIDANCE FOR RADJ TX DLVR: CPT | Performed by: RADIOLOGY

## 2018-07-30 PROCEDURE — 82043 UR ALBUMIN QUANTITATIVE: CPT | Performed by: FAMILY MEDICINE

## 2018-07-30 PROCEDURE — 99397 PER PM REEVAL EST PAT 65+ YR: CPT | Performed by: FAMILY MEDICINE

## 2018-07-30 PROCEDURE — 77412 RADIATION TX DELIVERY LVL 3: CPT | Performed by: RADIOLOGY

## 2018-07-30 PROCEDURE — 82570 ASSAY OF URINE CREATININE: CPT | Performed by: FAMILY MEDICINE

## 2018-07-30 RX ORDER — ENALAPRIL MALEATE 20 MG/1
40 TABLET ORAL DAILY
Qty: 180 TABLET | Refills: 1 | Status: SHIPPED | OUTPATIENT
Start: 2018-07-30 | End: 2019-01-01

## 2018-07-30 NOTE — PROGRESS NOTES
Patient presents with:  Physical     HPI:   Aida Donohue is a 71year old male who presents for a complete physical exam. New diagnosis of metastatic lung cancer a month and a half ago. Last colonoscopy:  10/2016. Repeat 10 yrs.    Last PSA:  Normal total) by mouth 2 (two) times daily.  Disp: 180 tablet Rfl: 0   Rosuvastatin Calcium 40 MG Oral Tab TAKE 1 2   TABLET BY MOUTH AT BEDTIME Disp:  Rfl: 5   Prochlorperazine Maleate (COMPAZINE) 10 mg tablet Take 1 tablet (10 mg total) by mouth every 6 (six) ho common carotid artery stent placement 2/12/2018   • History of common carotid artery stent placement 2/12/2018   • Hyperlipidemia 6/29/2012   • Osteoarthrosis, unspecified whether generalized or localized, unspecified site    • Peripheral vascular disease lb   BMI 27.72 kg/m²   Body mass index is 27.72 kg/m². General appearance: alert, appears stated age and cooperative  Eyes: conjunctivae/corneas clear. PERRL, EOM's intact.    Ears: normal TM's and external ear canals both ears  Nose: Nares normal. Sept cerebral white matter. 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     ASSESSMENT AND PLAN:     Orlin Womack was seen in the office today:  had concerns including Physical.    1. Annual physical exam  Overall biggest concern is the new metastatic lung

## 2018-07-30 NOTE — PROGRESS NOTES
[facility] Radiation Treatment Management Note 11-15    Patient:  Antonieta Nation  Age:  71year old  Visit Diagnosis:    1.  Malignant neoplasm of lower lobe of right lung [facility])      Primary Rad/Onc:  Dr. Tyra Guzman    Site [place]

## 2018-07-31 PROCEDURE — 77412 RADIATION TX DELIVERY LVL 3: CPT | Performed by: RADIOLOGY

## 2018-07-31 PROCEDURE — 77387 GUIDANCE FOR RADJ TX DLVR: CPT | Performed by: RADIOLOGY

## 2018-08-01 ENCOUNTER — HOSPITAL ENCOUNTER (OUTPATIENT)
Dept: RADIATION ONCOLOGY | Facility: HOSPITAL | Age: 73
Discharge: HOME | End: 2018-08-01
Attending: RADIOLOGY
Payer: COMMERCIAL

## 2018-08-01 PROCEDURE — 77412 RADIATION TX DELIVERY LVL 3: CPT | Performed by: RADIOLOGY

## 2018-08-01 PROCEDURE — 77387 GUIDANCE FOR RADJ TX DLVR: CPT | Performed by: RADIOLOGY

## 2018-08-02 PROCEDURE — 77412 RADIATION TX DELIVERY LVL 3: CPT | Performed by: RADIOLOGY

## 2018-08-02 PROCEDURE — 77387 GUIDANCE FOR RADJ TX DLVR: CPT | Performed by: RADIOLOGY

## 2018-08-03 PROCEDURE — 77412 RADIATION TX DELIVERY LVL 3: CPT | Performed by: RADIOLOGY

## 2018-08-03 PROCEDURE — 77336 RADIATION PHYSICS CONSULT: CPT | Performed by: RADIOLOGY

## 2018-08-03 PROCEDURE — 77387 GUIDANCE FOR RADJ TX DLVR: CPT | Performed by: RADIOLOGY

## 2018-08-06 ENCOUNTER — HOSPITAL ENCOUNTER (OUTPATIENT)
Dept: RADIATION ONCOLOGY | Facility: HOSPITAL | Age: 73
Discharge: HOME | End: 2018-08-06
Attending: RADIOLOGY
Payer: COMMERCIAL

## 2018-08-06 ENCOUNTER — TELEPHONE (OUTPATIENT)
Dept: HEMATOLOGY/ONCOLOGY | Facility: HOSPITAL | Age: 73
End: 2018-08-06

## 2018-08-06 ENCOUNTER — OFFICE VISIT (OUTPATIENT)
Dept: HEMATOLOGY/ONCOLOGY | Facility: HOSPITAL | Age: 73
End: 2018-08-06
Attending: INTERNAL MEDICINE
Payer: COMMERCIAL

## 2018-08-06 VITALS
OXYGEN SATURATION: 98 % | SYSTOLIC BLOOD PRESSURE: 155 MMHG | RESPIRATION RATE: 17 BRPM | DIASTOLIC BLOOD PRESSURE: 82 MMHG | HEART RATE: 73 BPM

## 2018-08-06 VITALS
RESPIRATION RATE: 20 BRPM | TEMPERATURE: 98 F | SYSTOLIC BLOOD PRESSURE: 123 MMHG | OXYGEN SATURATION: 94 % | HEART RATE: 87 BPM | HEIGHT: 67.01 IN | WEIGHT: 179.63 LBS | DIASTOLIC BLOOD PRESSURE: 73 MMHG | BODY MASS INDEX: 28.19 KG/M2

## 2018-08-06 DIAGNOSIS — C78.7 LIVER METASTASIS (HCC): ICD-10-CM

## 2018-08-06 DIAGNOSIS — R59.0 MEDIASTINAL ADENOPATHY: ICD-10-CM

## 2018-08-06 DIAGNOSIS — D64.81 ANEMIA ASSOCIATED WITH CHEMOTHERAPY: ICD-10-CM

## 2018-08-06 DIAGNOSIS — C34.90 LUNG CANCER (HCC): Primary | ICD-10-CM

## 2018-08-06 DIAGNOSIS — C34.01 MALIGNANT NEOPLASM OF HILUS OF RIGHT LUNG (HCC): Primary | ICD-10-CM

## 2018-08-06 DIAGNOSIS — T45.1X5A ANEMIA ASSOCIATED WITH CHEMOTHERAPY: ICD-10-CM

## 2018-08-06 LAB
ALBUMIN SERPL-MCNC: 3.4 G/DL (ref 3.5–4.8)
ALBUMIN/GLOB SERPL: 0.9 {RATIO} (ref 1–2)
ALP LIVER SERPL-CCNC: 86 U/L (ref 45–117)
ALT SERPL-CCNC: 23 U/L (ref 17–63)
ANION GAP SERPL CALC-SCNC: 9 MMOL/L (ref 0–18)
AST SERPL-CCNC: 16 U/L (ref 15–41)
BASOPHILS # BLD AUTO: 0.02 X10(3) UL (ref 0–0.1)
BASOPHILS NFR BLD AUTO: 0.4 %
BILIRUB SERPL-MCNC: 0.2 MG/DL (ref 0.1–2)
BUN BLD-MCNC: 38 MG/DL (ref 8–20)
BUN/CREAT SERPL: 29.9 (ref 10–20)
CALCIUM BLD-MCNC: 9.1 MG/DL (ref 8.3–10.3)
CHLORIDE SERPL-SCNC: 112 MMOL/L (ref 101–111)
CO2 SERPL-SCNC: 18 MMOL/L (ref 22–32)
CREAT BLD-MCNC: 1.27 MG/DL (ref 0.7–1.3)
EOSINOPHIL # BLD AUTO: 0.11 X10(3) UL (ref 0–0.3)
EOSINOPHIL NFR BLD AUTO: 2.4 %
ERYTHROCYTE [DISTWIDTH] IN BLOOD BY AUTOMATED COUNT: 13.2 % (ref 11.5–16)
GLOBULIN PLAS-MCNC: 3.7 G/DL (ref 2.5–3.7)
GLUCOSE BLD-MCNC: 146 MG/DL (ref 70–99)
HCT VFR BLD AUTO: 37.6 % (ref 37–53)
HGB BLD-MCNC: 12.1 G/DL (ref 13–17)
IMMATURE GRANULOCYTE COUNT: 0.07 X10(3) UL (ref 0–1)
IMMATURE GRANULOCYTE RATIO %: 1.5 %
LYMPHOCYTES # BLD AUTO: 0.64 X10(3) UL (ref 0.9–4)
LYMPHOCYTES NFR BLD AUTO: 13.9 %
M PROTEIN MFR SERPL ELPH: 7.1 G/DL (ref 6.1–8.3)
MCH RBC QN AUTO: 32.7 PG (ref 27–33.2)
MCHC RBC AUTO-ENTMCNC: 32.2 G/DL (ref 31–37)
MCV RBC AUTO: 101.6 FL (ref 80–99)
MONOCYTES # BLD AUTO: 0.66 X10(3) UL (ref 0.1–1)
MONOCYTES NFR BLD AUTO: 14.4 %
NEUTROPHIL ABS PRELIM: 3.09 X10 (3) UL (ref 1.3–6.7)
NEUTROPHILS # BLD AUTO: 3.09 X10(3) UL (ref 1.3–6.7)
NEUTROPHILS NFR BLD AUTO: 67.4 %
OSMOLALITY SERPL CALC.SUM OF ELEC: 300 MOSM/KG (ref 275–295)
PLATELET # BLD AUTO: 239 10(3)UL (ref 150–450)
POTASSIUM SERPL-SCNC: 5 MMOL/L (ref 3.6–5.1)
RBC # BLD AUTO: 3.7 X10(6)UL (ref 3.8–5.8)
RED CELL DISTRIBUTION WIDTH-SD: 48.3 FL (ref 35.1–46.3)
SODIUM SERPL-SCNC: 139 MMOL/L (ref 136–144)
WBC # BLD AUTO: 4.6 X10(3) UL (ref 4–13)

## 2018-08-06 PROCEDURE — 85025 COMPLETE CBC W/AUTO DIFF WBC: CPT

## 2018-08-06 PROCEDURE — 96361 HYDRATE IV INFUSION ADD-ON: CPT

## 2018-08-06 PROCEDURE — 96366 THER/PROPH/DIAG IV INF ADDON: CPT

## 2018-08-06 PROCEDURE — 77387 GUIDANCE FOR RADJ TX DLVR: CPT | Performed by: RADIOLOGY

## 2018-08-06 PROCEDURE — 96375 TX/PRO/DX INJ NEW DRUG ADDON: CPT

## 2018-08-06 PROCEDURE — 96376 TX/PRO/DX INJ SAME DRUG ADON: CPT

## 2018-08-06 PROCEDURE — 80053 COMPREHEN METABOLIC PANEL: CPT

## 2018-08-06 PROCEDURE — 96367 TX/PROPH/DG ADDL SEQ IV INF: CPT

## 2018-08-06 PROCEDURE — 96360 HYDRATION IV INFUSION INIT: CPT

## 2018-08-06 PROCEDURE — 96417 CHEMO IV INFUS EACH ADDL SEQ: CPT

## 2018-08-06 PROCEDURE — 96413 CHEMO IV INFUSION 1 HR: CPT

## 2018-08-06 PROCEDURE — 99215 OFFICE O/P EST HI 40 MIN: CPT | Performed by: INTERNAL MEDICINE

## 2018-08-06 PROCEDURE — 77412 RADIATION TX DELIVERY LVL 3: CPT | Performed by: RADIOLOGY

## 2018-08-06 RX ORDER — LORAZEPAM 0.5 MG/1
TABLET ORAL EVERY 4 HOURS PRN
Status: CANCELLED | OUTPATIENT
Start: 2018-08-08

## 2018-08-06 RX ORDER — PROCHLORPERAZINE MALEATE 10 MG
10 TABLET ORAL EVERY 6 HOURS PRN
Status: CANCELLED | OUTPATIENT
Start: 2018-08-09

## 2018-08-06 RX ORDER — PROCHLORPERAZINE MALEATE 10 MG
10 TABLET ORAL EVERY 6 HOURS PRN
Status: CANCELLED | OUTPATIENT
Start: 2018-08-08

## 2018-08-06 RX ORDER — PROCHLORPERAZINE MALEATE 10 MG
10 TABLET ORAL EVERY 6 HOURS PRN
Status: CANCELLED | OUTPATIENT
Start: 2018-08-06

## 2018-08-06 RX ORDER — LORAZEPAM 0.5 MG/1
TABLET ORAL EVERY 4 HOURS PRN
Status: CANCELLED | OUTPATIENT
Start: 2018-01-01

## 2018-08-06 RX ORDER — PROCHLORPERAZINE MALEATE 10 MG
10 TABLET ORAL EVERY 6 HOURS PRN
Status: CANCELLED | OUTPATIENT
Start: 2018-08-10

## 2018-08-06 RX ORDER — LORAZEPAM 2 MG/ML
INJECTION INTRAMUSCULAR EVERY 4 HOURS PRN
Status: CANCELLED | OUTPATIENT
Start: 2018-08-10

## 2018-08-06 RX ORDER — SODIUM CHLORIDE 9 MG/ML
1000 INJECTION, SOLUTION INTRAVENOUS ONCE
Status: CANCELLED | OUTPATIENT
Start: 2018-08-06

## 2018-08-06 RX ORDER — LORAZEPAM 2 MG/ML
INJECTION INTRAMUSCULAR EVERY 4 HOURS PRN
Status: CANCELLED | OUTPATIENT
Start: 2018-08-09

## 2018-08-06 RX ORDER — PROCHLORPERAZINE MALEATE 10 MG
10 TABLET ORAL EVERY 6 HOURS PRN
Status: CANCELLED | OUTPATIENT
Start: 2018-01-01

## 2018-08-06 RX ORDER — ONDANSETRON 2 MG/ML
8 INJECTION INTRAMUSCULAR; INTRAVENOUS EVERY 6 HOURS PRN
Status: CANCELLED | OUTPATIENT
Start: 2018-08-09

## 2018-08-06 RX ORDER — SODIUM CHLORIDE 9 MG/ML
1000 INJECTION, SOLUTION INTRAVENOUS ONCE
Status: CANCELLED | OUTPATIENT
Start: 2018-01-01

## 2018-08-06 RX ORDER — LORAZEPAM 2 MG/ML
INJECTION INTRAMUSCULAR EVERY 4 HOURS PRN
Status: CANCELLED | OUTPATIENT
Start: 2018-08-06

## 2018-08-06 RX ORDER — LORAZEPAM 0.5 MG/1
TABLET ORAL EVERY 4 HOURS PRN
Status: CANCELLED | OUTPATIENT
Start: 2018-08-07

## 2018-08-06 RX ORDER — METOCLOPRAMIDE HYDROCHLORIDE 5 MG/ML
10 INJECTION INTRAMUSCULAR; INTRAVENOUS EVERY 6 HOURS PRN
Status: CANCELLED | OUTPATIENT
Start: 2018-08-09

## 2018-08-06 RX ORDER — ONDANSETRON 2 MG/ML
8 INJECTION INTRAMUSCULAR; INTRAVENOUS EVERY 6 HOURS PRN
Status: CANCELLED | OUTPATIENT
Start: 2018-08-10

## 2018-08-06 RX ORDER — LORAZEPAM 2 MG/ML
INJECTION INTRAMUSCULAR EVERY 4 HOURS PRN
Status: CANCELLED | OUTPATIENT
Start: 2018-08-08

## 2018-08-06 RX ORDER — LORAZEPAM 2 MG/ML
INJECTION INTRAMUSCULAR EVERY 4 HOURS PRN
Status: CANCELLED | OUTPATIENT
Start: 2018-08-07

## 2018-08-06 RX ORDER — ONDANSETRON 2 MG/ML
8 INJECTION INTRAMUSCULAR; INTRAVENOUS EVERY 6 HOURS PRN
Status: CANCELLED | OUTPATIENT
Start: 2018-01-01

## 2018-08-06 RX ORDER — LORAZEPAM 0.5 MG/1
TABLET ORAL EVERY 4 HOURS PRN
Status: CANCELLED | OUTPATIENT
Start: 2018-08-10

## 2018-08-06 RX ORDER — ONDANSETRON 2 MG/ML
8 INJECTION INTRAMUSCULAR; INTRAVENOUS EVERY 6 HOURS PRN
Status: CANCELLED | OUTPATIENT
Start: 2018-08-08

## 2018-08-06 RX ORDER — METOCLOPRAMIDE HYDROCHLORIDE 5 MG/ML
10 INJECTION INTRAMUSCULAR; INTRAVENOUS EVERY 6 HOURS PRN
Status: CANCELLED | OUTPATIENT
Start: 2018-08-10

## 2018-08-06 RX ORDER — METOCLOPRAMIDE HYDROCHLORIDE 5 MG/ML
10 INJECTION INTRAMUSCULAR; INTRAVENOUS EVERY 6 HOURS PRN
Status: CANCELLED | OUTPATIENT
Start: 2018-08-08

## 2018-08-06 RX ORDER — METOCLOPRAMIDE HYDROCHLORIDE 5 MG/ML
10 INJECTION INTRAMUSCULAR; INTRAVENOUS EVERY 6 HOURS PRN
Status: CANCELLED | OUTPATIENT
Start: 2018-08-07

## 2018-08-06 RX ORDER — PROCHLORPERAZINE MALEATE 10 MG
10 TABLET ORAL EVERY 6 HOURS PRN
Status: CANCELLED | OUTPATIENT
Start: 2018-08-07

## 2018-08-06 RX ORDER — LORAZEPAM 2 MG/ML
INJECTION INTRAMUSCULAR EVERY 4 HOURS PRN
Status: CANCELLED | OUTPATIENT
Start: 2018-01-01

## 2018-08-06 RX ORDER — LORAZEPAM 0.5 MG/1
TABLET ORAL EVERY 4 HOURS PRN
Status: CANCELLED | OUTPATIENT
Start: 2018-08-09

## 2018-08-06 RX ORDER — SODIUM CHLORIDE 9 MG/ML
1000 INJECTION, SOLUTION INTRAVENOUS ONCE
Status: COMPLETED | OUTPATIENT
Start: 2018-08-06 | End: 2018-08-06

## 2018-08-06 RX ORDER — ONDANSETRON 2 MG/ML
8 INJECTION INTRAMUSCULAR; INTRAVENOUS EVERY 6 HOURS PRN
Status: CANCELLED | OUTPATIENT
Start: 2018-08-07

## 2018-08-06 RX ORDER — ONDANSETRON 2 MG/ML
8 INJECTION INTRAMUSCULAR; INTRAVENOUS EVERY 6 HOURS PRN
Status: CANCELLED | OUTPATIENT
Start: 2018-08-06

## 2018-08-06 RX ORDER — METOCLOPRAMIDE HYDROCHLORIDE 5 MG/ML
10 INJECTION INTRAMUSCULAR; INTRAVENOUS EVERY 6 HOURS PRN
Status: CANCELLED | OUTPATIENT
Start: 2018-01-01

## 2018-08-06 RX ORDER — LORAZEPAM 0.5 MG/1
TABLET ORAL EVERY 4 HOURS PRN
Status: CANCELLED | OUTPATIENT
Start: 2018-08-06

## 2018-08-06 RX ORDER — METOCLOPRAMIDE HYDROCHLORIDE 5 MG/ML
10 INJECTION INTRAMUSCULAR; INTRAVENOUS EVERY 6 HOURS PRN
Status: CANCELLED | OUTPATIENT
Start: 2018-08-06

## 2018-08-06 RX ADMIN — SODIUM CHLORIDE 1000 ML: 9 INJECTION, SOLUTION INTRAVENOUS at 14:10:00

## 2018-08-06 NOTE — PROGRESS NOTES
IV infiltrated. Patient did not notice the swelling. Notified MD nurse. Juan Peon called and left  for PICC team. Order placed. Patient aware to hold blood thinners tonight and until he hears anything.

## 2018-08-06 NOTE — PROGRESS NOTES
[facility] Radiation Treatment Management Note 16-20    Patient:  Addy Sierra  Age:  71year old  Visit Diagnosis:    No diagnosis found.   Primary Rad/Onc:  Dr. Mcgee [facility]    Site Delivered Dose (Gy) Prescribed Dose (Gy) Naya Story

## 2018-08-06 NOTE — PROGRESS NOTES
Cancer Center Progress Note    Patient Name: Yaz Haley   YOB: 1945   Medical Record Number: [id number]   CSN: [id number]   Attending Physician: Ekaterina Garcia M.D.    Referring Physician: Bo Malin MD      Date of Visit: 8/6/2018 lesion.      - started chemoRT with cisplatin and etoposide 7/9/18    History of Present Illness:  Says he feels he did fairly well with his first cycle of chemotherapy. He lost most of his hair last week.  He reported some fatigue, taste changes and night (8 mg total) by mouth every 8 (eight) hours as needed for Nausea., Disp: 30 tablet, Rfl: 3  •  dexamethasone (DECADRON) 4 MG tablet, Take 2 tablets (8 mg total) by mouth daily. Take two tablets daily for 3 days, starting day 9 of treatment. , Disp: 6 tablet Mother      breast cancer   • depression [OTHER] Brother    • Stroke Paternal Grandfather        Psychosocial History:    Social History  Social History   Marital status:   Spouse name: N/A    Years of education: N/A  Number of children: N/A     Occ palp  Neurological: Grossly intact.        Laboratory:    Lab Results  Component Value Date   WBC 4.6 08/06/2018   RBC 3.70 (L) 08/06/2018   HGB 12.1 (L) 08/06/2018   HCT 37.6 08/06/2018   .6 (H) 08/06/2018   MCH 32.7 08/06/2018   MCHC 32.2 08/06/201

## 2018-08-06 NOTE — PROGRESS NOTES
Pt here for C2D1.   Arrives Ambulating independently, accompanied by Family member           Modifications in dose or schedule: No     Frequency of blood return and site check throughout administration: Prior to administration   Discharged to Home, [address]

## 2018-08-07 ENCOUNTER — HOSPITAL ENCOUNTER (OUTPATIENT)
Dept: PERIOP | Facility: HOSPITAL | Age: 73
Discharge: HOME | End: 2018-08-07
Attending: INTERNAL MEDICINE
Payer: COMMERCIAL

## 2018-08-07 ENCOUNTER — OFFICE VISIT (OUTPATIENT)
Dept: HEMATOLOGY/ONCOLOGY | Facility: HOSPITAL | Age: 73
End: 2018-08-07
Attending: INTERNAL MEDICINE
Payer: COMMERCIAL

## 2018-08-07 ENCOUNTER — HOSPITAL ENCOUNTER (OUTPATIENT)
Dept: RADIATION ONCOLOGY | Facility: HOSPITAL | Age: 73
Discharge: HOME | End: 2018-08-07
Attending: RADIOLOGY
Payer: COMMERCIAL

## 2018-08-07 VITALS
OXYGEN SATURATION: 96 % | RESPIRATION RATE: 20 BRPM | DIASTOLIC BLOOD PRESSURE: 83 MMHG | SYSTOLIC BLOOD PRESSURE: 168 MMHG | HEART RATE: 90 BPM | TEMPERATURE: 98 F

## 2018-08-07 DIAGNOSIS — C34.01 MALIGNANT NEOPLASM OF HILUS OF RIGHT LUNG (HCC): Primary | ICD-10-CM

## 2018-08-07 PROCEDURE — 77412 RADIATION TX DELIVERY LVL 3: CPT | Performed by: RADIOLOGY

## 2018-08-07 PROCEDURE — 96375 TX/PRO/DX INJ NEW DRUG ADDON: CPT

## 2018-08-07 PROCEDURE — 36569 INSJ PICC 5 YR+ W/O IMAGING: CPT

## 2018-08-07 PROCEDURE — 96413 CHEMO IV INFUSION 1 HR: CPT

## 2018-08-07 PROCEDURE — 77387 GUIDANCE FOR RADJ TX DLVR: CPT | Performed by: RADIOLOGY

## 2018-08-07 PROCEDURE — 76937 US GUIDE VASCULAR ACCESS: CPT

## 2018-08-07 RX ORDER — SODIUM CHLORIDE 0.9 % (FLUSH) 0.9 %
10 SYRINGE (ML) INJECTION ONCE
Status: COMPLETED | OUTPATIENT
Start: 2018-08-07 | End: 2018-08-07

## 2018-08-07 RX ORDER — SODIUM CHLORIDE 0.9 % (FLUSH) 0.9 %
10 SYRINGE (ML) INJECTION ONCE
Status: CANCELLED | OUTPATIENT
Start: 2018-08-07

## 2018-08-07 RX ADMIN — SODIUM CHLORIDE 0.9 % (FLUSH) 10 ML: 0.9 % SYRINGE (ML) INJECTION at 15:17:00

## 2018-08-07 NOTE — PROGRESS NOTES
Pt here for C2D2 Etoposide for Lung CA. Unable to obtain IV access peripherally for chemo this AM. Order for PICC line placed yesterday, patient was able to get PICC placed at noon today after radiation, then came back here for chemotherapy.  Appt for next

## 2018-08-07 NOTE — PROGRESS NOTES
[facility] Radiation Treatment Management Note 16-20    Patient:  Urbano Okeefe  Age:  71year old  Visit Diagnosis:    No diagnosis found.   Primary Rad/Onc:  Dr. Zamzam Cruz    Site Delivered Dose (Gy) Prescribed Dose (Gy) Franca Marcano

## 2018-08-08 ENCOUNTER — HOSPITAL ENCOUNTER (OUTPATIENT)
Dept: RADIATION ONCOLOGY | Facility: HOSPITAL | Age: 73
Discharge: HOME | End: 2018-08-08
Attending: RADIOLOGY
Payer: COMMERCIAL

## 2018-08-08 ENCOUNTER — OFFICE VISIT (OUTPATIENT)
Dept: HEMATOLOGY/ONCOLOGY | Facility: HOSPITAL | Age: 73
End: 2018-08-08
Attending: INTERNAL MEDICINE
Payer: COMMERCIAL

## 2018-08-08 VITALS
DIASTOLIC BLOOD PRESSURE: 89 MMHG | OXYGEN SATURATION: 98 % | SYSTOLIC BLOOD PRESSURE: 181 MMHG | HEART RATE: 93 BPM | RESPIRATION RATE: 18 BRPM | TEMPERATURE: 99 F

## 2018-08-08 VITALS
DIASTOLIC BLOOD PRESSURE: 98 MMHG | BODY MASS INDEX: 28 KG/M2 | RESPIRATION RATE: 18 BRPM | SYSTOLIC BLOOD PRESSURE: 182 MMHG | HEART RATE: 76 BPM | WEIGHT: 176.81 LBS | OXYGEN SATURATION: 97 %

## 2018-08-08 DIAGNOSIS — C34.31 MALIGNANT NEOPLASM OF LOWER LOBE OF RIGHT LUNG (HCC): Primary | ICD-10-CM

## 2018-08-08 DIAGNOSIS — C34.01 MALIGNANT NEOPLASM OF HILUS OF RIGHT LUNG (HCC): Primary | ICD-10-CM

## 2018-08-08 PROCEDURE — 96367 TX/PROPH/DG ADDL SEQ IV INF: CPT

## 2018-08-08 PROCEDURE — 96409 CHEMO IV PUSH SNGL DRUG: CPT

## 2018-08-08 PROCEDURE — 77412 RADIATION TX DELIVERY LVL 3: CPT | Performed by: RADIOLOGY

## 2018-08-08 PROCEDURE — 96413 CHEMO IV INFUSION 1 HR: CPT

## 2018-08-08 PROCEDURE — 77387 GUIDANCE FOR RADJ TX DLVR: CPT | Performed by: RADIOLOGY

## 2018-08-08 NOTE — PROGRESS NOTES
Pt here for C2D3.   Arrives Ambulating independently, accompanied by Other alone           Modifications in dose or schedule: No     Frequency of blood return and site check throughout administration: Prior to administration   Discharged to Home, Ambulating

## 2018-08-08 NOTE — PROGRESS NOTES
Nutrition Consultation     Patient Name: Johana Rucker  YOB: 1945  Medical Record Number: [id number]      Account Number: [de-identified]  Dietitian: Alberto Lainez RD     Date of visit: 08/08/2018     Diet Rx: high protein, soft diet     Pertine daily for 3 days, starting day 9 of treatment. , Disp: 6 tablet, Rfl: 3  •  aspirin 325 MG Oral Tab, Take 162.5 mg by mouth daily. Takes half a tablet daily.  162.5mg , Disp: , Rfl:   •  DiphenhydrAMINE HCl (BENADRYL ALLERGY) 25 MG Oral Cap, Please take 2 ta high protein Juice Plus supplement daily, he noted, \"I'm stubborn, and I'm gonna do what I want. I had one yesterday, but today I had oatmeal with Activia and fruit. \" Pt otherwise notes a good appetite. RD continues to offer support/encouragement.  LOUIE flower

## 2018-08-08 NOTE — PROGRESS NOTES
[facility] Radiation Treatment Management Note 21-25    Patient:  Helena Galloway  Age:  71year old  Visit Diagnosis:    1.  Malignant neoplasm of lower lobe of right lung [facility])      Primary Rad/Onc:  Dr. Chris Pink    [facility] [place]

## 2018-08-08 NOTE — PROGRESS NOTES
Upon completion of -16, bp rechecked manually, 199/95. Patient states he hasn't taken bp meds yet. Denies complaints.

## 2018-08-09 ENCOUNTER — OFFICE VISIT (OUTPATIENT)
Dept: HEMATOLOGY/ONCOLOGY | Facility: HOSPITAL | Age: 73
End: 2018-08-09
Attending: INTERNAL MEDICINE
Payer: COMMERCIAL

## 2018-08-09 VITALS
RESPIRATION RATE: 18 BRPM | SYSTOLIC BLOOD PRESSURE: 147 MMHG | TEMPERATURE: 98 F | DIASTOLIC BLOOD PRESSURE: 81 MMHG | HEART RATE: 80 BPM | OXYGEN SATURATION: 98 %

## 2018-08-09 DIAGNOSIS — C34.01 MALIGNANT NEOPLASM OF HILUS OF RIGHT LUNG (HCC): Primary | ICD-10-CM

## 2018-08-09 PROCEDURE — 96413 CHEMO IV INFUSION 1 HR: CPT

## 2018-08-09 PROCEDURE — 77387 GUIDANCE FOR RADJ TX DLVR: CPT | Performed by: RADIOLOGY

## 2018-08-09 PROCEDURE — 77412 RADIATION TX DELIVERY LVL 3: CPT | Performed by: RADIOLOGY

## 2018-08-09 PROCEDURE — 96375 TX/PRO/DX INJ NEW DRUG ADDON: CPT

## 2018-08-09 RX ORDER — SODIUM CHLORIDE 0.9 % (FLUSH) 0.9 %
10 SYRINGE (ML) INJECTION ONCE
Status: DISCONTINUED | OUTPATIENT
Start: 2018-08-09 | End: 2018-08-09

## 2018-08-09 RX ORDER — SODIUM CHLORIDE 0.9 % (FLUSH) 0.9 %
10 SYRINGE (ML) INJECTION ONCE
Status: CANCELLED | OUTPATIENT
Start: 2018-08-09

## 2018-08-10 ENCOUNTER — OFFICE VISIT (OUTPATIENT)
Dept: HEMATOLOGY/ONCOLOGY | Facility: HOSPITAL | Age: 73
End: 2018-08-10
Attending: INTERNAL MEDICINE
Payer: COMMERCIAL

## 2018-08-10 VITALS
DIASTOLIC BLOOD PRESSURE: 81 MMHG | RESPIRATION RATE: 20 BRPM | SYSTOLIC BLOOD PRESSURE: 158 MMHG | TEMPERATURE: 99 F | HEART RATE: 82 BPM

## 2018-08-10 DIAGNOSIS — C34.01 MALIGNANT NEOPLASM OF HILUS OF RIGHT LUNG (HCC): Primary | ICD-10-CM

## 2018-08-10 PROCEDURE — 96375 TX/PRO/DX INJ NEW DRUG ADDON: CPT

## 2018-08-10 PROCEDURE — 77412 RADIATION TX DELIVERY LVL 3: CPT | Performed by: RADIOLOGY

## 2018-08-10 PROCEDURE — 96413 CHEMO IV INFUSION 1 HR: CPT

## 2018-08-10 PROCEDURE — 77387 GUIDANCE FOR RADJ TX DLVR: CPT | Performed by: RADIOLOGY

## 2018-08-10 PROCEDURE — 77336 RADIATION PHYSICS CONSULT: CPT | Performed by: RADIOLOGY

## 2018-08-10 NOTE — PROGRESS NOTES
Pt here for C2D5.   Arrives Ambulating independently, accompanied by Other alone           Modifications in dose or schedule: No     Frequency of blood return and site check throughout administration: Prior to administration   Discharged to Home, Ambulating

## 2018-08-15 NOTE — PROGRESS NOTES
[facility] Radiation Treatment Management Note 21-25    Patient:  Orlin Womack  Age:  71year old  Visit Diagnosis:    1.  Malignant neoplasm of lower lobe of right lung [facility])      Primary Rad/Onc:  Dr. Elin Peck    Site [place]

## 2018-08-16 NOTE — PROGRESS NOTES
Patient presents with:  Edema: APN assessment; hypertension and edema     Pt is here for a sick call; he reports high blood pressures at home and increase in swelling in his legs and arms.  He had treatment last week and feels that he has not had enough out

## 2018-08-17 NOTE — PROGRESS NOTES
ANP Visit Note    Patient Name: Everardo Barkley   YOB: 1945   Medical Record Number: [id number]   CSN: [id number]   Date of visit: 8/16/2018       Chief Complaint/Reason for Visit:  Patient presents with:  Edema: APN assessment; hypertension a 7/9/18\    History of Present Illness: Patient of Dr. Garima Valdez with lung cancer presents today for follow up from cycle 2 cisplatin and etoposide that was completed on 8/13.  Patient states he has developed lower extremity swelling and HTN since having his c • Prediabetes     Diet Control   • S/P carotid endarterectomy 3/11/2016   • Shortness of breath    • STROKE     TIA 1982 - no residual   • Unspecified sleep apnea 6/29/2012   • Visual impairment     glasses       Surgical History:  Past Surgical History: Yes    Comment: walks daily    Seat Belt Yes     Social History Narrative   None on file       Medications:    Current Outpatient Prescriptions:   •  Nutritional Supplements (JUICE PLUS FIBRE OR), Take by mouth., Disp: , Rfl:   •  Enalapril Maleate 20 MG O negatives noted in the the HPI. Performance Status: ECOG 1     Physical Examination:  General: Patient is alert and oriented x 3, not in acute distress.   Vital Signs: BP (!) 190/90 (BP Location: Left arm, Patient Position: Sitting, Cuff Size: adult)   P Final  RDW-SD                                        Date: 08/13/2018  Value: 45.6        Ref range: 35.1 - 46.3 fL     Status: Final  Neutrophil Absolute Prelim                    Date: 08/13/2018  Value: 5.26        Ref range: 1.30 - 6.70 x10 *  Status: Monday for repeat labs     A total of 30 minutes were spent with the patient and family of which 90% minutes were spent counseling on plan of care.     Emotional Well Being:  I have assessed the patient's emotional well-being and any concerns about anxiety

## 2018-08-17 NOTE — TELEPHONE ENCOUNTER
Patient calling inform Dr. Brittany Beauchamp that after he was given diuretic yesterday at he put out about 1875 ml of urine output, his wrists, arms, and ankles are better. /87; NM 81 - Dr. Brittany Beauchamp informed. Per Dr. Brittany Beauchamp no further diuretic.  Call if he ha

## 2018-08-20 NOTE — PROGRESS NOTES
[facility] Radiation Treatment Management Note 26-30    Patient:  Mahesh Paige  Age:  71year old  Visit Diagnosis:    1.  Malignant neoplasm of lower lobe of right lung [facility])      Primary Rad/Onc:  Dr. Gregg Michael    [facility] [place]

## 2018-08-22 NOTE — PATIENT INSTRUCTIONS
POST-RADIATION INSTRUCTIONS:   - PLEASE STOP AT  OR CALL [phone] FOR A FOLLOW-UP WITH DR. SAMM MEDRANO IN ONE MONTH  - FOLLOW-UP WITH DR. Chio Potter UPON COMPLETION OF RADIATION.   - SIDE EFFECTS OF RADIATION WILL GRADUALLY SUBSIDE, IT MAY TAKE U

## 2018-08-27 NOTE — PROGRESS NOTES
[facility] Radiation Treatment Management Note 31-35    Patient:  Padmini Wong  Age:  71year old  Visit Diagnosis:    1.  Malignant neoplasm of lower lobe of right lung [facility])      Primary Rad/Onc:  Dr. Nilesh Dennis    [facility] [place]

## 2018-08-28 NOTE — TELEPHONE ENCOUNTER
Pt calling regarding PET scan on 9/25. Does patient need to take Prednisone and Benadryl premed prior to scan? Discussed with MD- premeds NOT needed prior to PET scan. Pt aware.

## 2018-08-28 NOTE — PROGRESS NOTES
Cancer Center Progress Note    Patient Name: Pop Meadows   YOB: 1945   Medical Record Number: [id number]   CSN: [id number]   Attending Physician: Adrianne Garcia M.D.    Referring Physician: Orlin Saez MD      Date of Visit: 8/27/201 consensus was to treat chest disease with chemoRT followed by SBRT to oligometastatic liver lesion.      - started chemoRT with cisplatin and etoposide 7/9/18    History of Present Illness:  Received his last dose of chemo 8/13. He says he is doing better. Take 1,000 mg by mouth daily. , Disp: , Rfl:   •  Omega-3 Fatty Acids (FISH OIL OR), Take 1,000 mg by mouth daily.   , Disp: , Rfl:   •  L-ARGININE OR, Take 1,000 mg by mouth.  , Disp: , Rfl:   •  dexamethasone (DECADRON) 4 MG tablet, Take 2 tablets (8 mg History:  Family History   Problem Relation Age of Onset   • Cancer Father      prostate cancer   • Cancer Mother      breast cancer   • depression [OTHER] Brother    • Stroke Paternal Grandfather        Psychosocial History:    Social History  Social Hist Diminished BS right   Heart: Regular rate and rhythm. Abdomen: Soft, non tender with good bowel sounds. No hepatosplenomegaly. No palpable mass. Extremities: Pedal pulses are present. No BLE edema.  Deformity right wrist/hand with exquisite tenderness

## 2018-09-04 NOTE — PROGRESS NOTES
Education Record    Learner:  Patient    Disease / Diagnosis: PICC line dressing change    Barriers / Limitations:  None   Comments:    Method:  Brief focused and Discussion   Comments:    General Topics:  Procedure and Fall risk and prevention   Comments:

## 2018-09-06 NOTE — PROGRESS NOTES
[facility]    PATIENT'S NAME: Fern Cody   RADIATION ONCOLOGIST: Marna Fleischer. Deanne Boswell M.D.    PATIENT ACCOUNT #: [de-identified] [facility]   MEDICAL RECORD #: [id number] YOB: 1945   DATE: 08/29/2018       RADIATION ONCOLOGY TR 6300 cGy in 180 cGy daily fractions. This was done with 3D conformal techniques and 18 MV photons. Treatment began on 07/11/2018, and completed on 08/29/2018, for a total of 50 days in which he received all 35 prescribed radiation treatments.   This was d

## 2018-09-11 NOTE — PROGRESS NOTES
Education Record    Learner:  Patient    Disease / Diagnosis: Lung Cancer    Barriers / Limitations:  None   Comments:    Method:  Brief focused   Comments:    General Topics:  Side effects and symptom management   Comments:    Outcome:  Shows understandin

## 2018-09-17 NOTE — PROGRESS NOTES
ANP Visit Note    Patient Name: Mahesh Paige   YOB: 1945   Medical Record Number: [id number]   CSN: [id number]   Date of visit: 9/17/2018       Chief Complaint/Reason for Visit:  Patient presents with:  Pain: APN assessment     Oncologic Hi his left arm. This has been hurting him since an IV was placed in that arm over a month ago. He has been applying warm compresses which initially helped but now the left forearm is more painful and tender. He denies any fevers or chills.  He has a PICC in t Unspecified sleep apnea 6/29/2012   • Visual impairment     glasses       Surgical History:  Past Surgical History:  No date: CAROTID ENDARTERECTOMY; Left  2015: CAROTID ENDARTERECTOMY  3/11/2016: CAROTID ENDARTERECTOMY; Right      Comment:  Performed by Evander Harada Retired    Tobacco Use      Smoking status: Former Smoker        Packs/day: 2.00        Years: 43.00        Pack years: 80        Types: Cigarettes        Quit date: 3/16/2006        Years since quittin.5      Smokeless tobacco: Former User    Substan Rfl:   •  Cholecalciferol (VITAMIN D) 2000 units Oral Cap, Take 2,000 Units by mouth daily. , Disp: , Rfl:   •  Glucosamine-Chondroit-Vit C-Mn (GLUCOSAMINE CHONDR 1500 COMPLX OR), Take 3,000 mg by mouth daily.   , Disp: , Rfl:   •  Coenzyme Q10 (CO Q10) 100 Ref range: 3.6 - 5.1 mmol/L   Status: Final  Chloride                                      Date: 08/27/2018  Value: 111         Ref range: 101 - 111 mmol/L   Status: Final  CO2                                           Date: 08/27/2018  Value: 22.0 Status: Final  Total Protein                                 Date: 08/27/2018  Value: 6.9         Ref range: 6.1 - 8.3 g/dL     Status: Final  Albumin                                       Date: 08/27/2018  Value: 3.3*        Ref range: 3.5 - 4.8 g/dL Absolute                           Date: 08/27/2018  Value: 1.47        Ref range: 1.30 - 6.70 x10(*  Status: Final  Lymphocyte Absolute                           Date: 08/27/2018  Value: 0.38*       Ref range: 0.90 - 4.00 x10(*  Status: Final  Monocyte Ab Will call with US if positive, may need short term DOAC     Risk Level: High: Lung Cancer with phlebitis     Electronically Signed by:    Dionicio Boone ANP-BC  Nurse Practitioner  THE [facility]

## 2018-09-17 NOTE — PROGRESS NOTES
Patient presents with:  Pain: APN assessment     Pt is here with sick call; pt complains of pain in left forearm. He has been using a warm compress to reduce pain along a vein without relief. Today he became concerned when it became red. He denied fever.  Ericate Loveless

## 2018-09-18 NOTE — PROGRESS NOTES
Education Record    Learner:  Patient    Disease / Radha Preciado [address] [facility] dressing change    Barriers / Limitations:  None   Comments:    Method:  Discussion   Comments:    General Topics:  Procedure   Comments:    Outcome:  Shows understanding   Comments:

## 2018-09-19 NOTE — TELEPHONE ENCOUNTER
Reviewed with him US Doppler, negative for DVT. He does not want to take a blood thinner (DOAC) as his vascular surgeon told him not to. He will continue the Naproxen and heat. He will call if worsens. States arm is not redder or warm to touch.

## 2018-10-03 NOTE — PROGRESS NOTES
Nursing Follow-Up Note    Patient: Elizabeth Parsons  YOB: 1945  Age: 71year old  Radiation Oncologist: Dr. Derrick Hernandez  Referring Physician: No ref. provider found  Chief Complaint: Patient presents with:   Follow - Up    Date: 10/3/2018

## 2018-10-03 NOTE — PROGRESS NOTES
Cancer Center Progress Note    Patient Name: Anton Hamm   YOB: 1945   Medical Record Number: [id number]   CSN: [id number]   Attending Physician: Kianna Hair M.D.    Referring Physician: Francia Perez MD      Date of Visit: 10/3/201 with chemoRT followed by SBRT to oligometastatic liver lesion.      - chemoRT with cisplatin and etoposide 7/9-8/13/18. History of Present Illness:  Says he is feeling better. Energy is improving but still with OWEN and cough.  Dysphagia has resolved and Diagnosis Date   • Anesthesia complication     [place]   • Atherosclerosis of artery of extremity with intermittent claudication ([facility] [address] 2/12/2018   • Back problem    • Carotid artery stenosis 2/17/2013   • COPD    • Essential hy breast cancer   • Other (depression) Brother    • Stroke Paternal Grandfather        Psychosocial History:  Social History    Socioeconomic History      Marital status:       Spouse name: Not on file      Number of children: Not on file DIARRHEA  Simvastatin             DIARRHEA    Comment:Tabs;     Review of Systems:  A 14-point ROS was done with pertinent positives and negative per the HPI    Vital Signs:  Please refer to the nursing notes which have been reviewed      Physical Examinat (URY=[id number]), 6/29/2018, 9:29. GEORGI ,  BIOPSY LIVER (CPT=76942/23319), 7/05/2018, 10:50. INDICATIONS:  C34.91 Malignant neoplasm of unspecified part of right bronchus or lung     TECHNIQUE:  The patient fasted for at least 6 hours.  F-18 FDG was inj used to evaluate the upper extremity venous system. B-mode two-dimensional images of the vascular structures, Doppler spectral analysis, and color flow. Doppler imaging were performed.   The   following veins were imaged:  Subclavian, Jugular, Axillary, Br may reconsider. I ordered an US of the ODETTE. Labs reviewed. Mild anemia from chemotherapy. White count has recovered. RTC after he completes RT to liver met.        Risk level: High- metastatic NSCLC s/p chemoRT    Emotional Well Being:  I have as

## 2018-10-03 NOTE — PATIENT INSTRUCTIONS
-Our radiation therapists will contact you today to schedule a CT simulation scan   - CALL IF YOU HAVE ANY QUESTIONS/CONCERNS REGARDING RADIATION THERAPY 21

## 2018-10-05 NOTE — TELEPHONE ENCOUNTER
Patient called to let Dr. Nikita Back know that he needs disablity placard form filled out. Stated that Dr. Nikita Back told him to call office when he was ready for the form to be filled out. Also wants to speak to a nurse to give update on cancer.

## 2018-10-05 NOTE — TELEPHONE ENCOUNTER
Called and talked to pateint let him know form filled out on Dr Genesis Ambrose desk to be signed we need to call him Monday tolet him know he can  the form.  He would like Dr Dior Rather to call him so he can ask him something about his cancer treatment

## 2018-10-08 NOTE — TELEPHONE ENCOUNTER
Called patient. Recent PET scan showed resolution of lung and lymph nodes, still with liver lesion  He is happy with this. Next step to consider next treatment for liver.

## 2018-10-10 NOTE — CONSULTS
[facility]    PATIENT'S NAME: Elvie Rowell   RADIATION ONCOLOGIST: Padma Borges. Yanet Marquez M.D.    [facility]   MED[facility] RECORD #: [id number] YOB: 1945   CONSULTATION DATE: 10/03/2018       RADIATIO significant difficulty. He does have some cough, which is productive of a white mucus and reports some dyspnea on exertion but no hemoptysis. His weight has been stable and his energy level is fair. He denies any other significant issues or problems.   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 He has responded very well to his thoracic treatment and has had PET resolution of disease in that location.   The o me the opportunity to participate in the care of this patient. If there should be any questions regarding the radiotherapy, please feel free to contact me at any time. Dictated By Delcie Hale Carolene Peabody, M.D.  d: 10/10/2018 09:48:45  t: 10/10/2018 10:25:54

## 2018-10-11 NOTE — TELEPHONE ENCOUNTER
Called with US results. Still with superficial thrombophlebitis which is worse despite NSAIDS. Also still symptomatic with increased arm pain. Discussed options- does not like injections. Decided on Eliquis and prescription sent to his pharmacy.  Stopped AS

## 2018-10-12 NOTE — TELEPHONE ENCOUNTER
Patient asking if he needs to stop his supplements (fish oil, CoQ10, glucosamine, L-arginine) while on Eliquis, has stopped taking Aspirin and Aleve as instructed by Dr. Chana Green. Per Dr. Chana Green, ok to continue supplements.  Patient informed and verbalized

## 2018-10-23 NOTE — PROGRESS NOTES
Education Record    Learner:  Patient    Disease / Latasha Escalante [address] [facility] dressing change    Barriers / Limitations:  None   Comments:    Method:  Discussion   Comments:    General Topics:  Procedure and Plan of care reviewed   Comments:    Outcome:  Shows

## 2018-10-23 NOTE — PROGRESS NOTES
Patient presents with:  Cough: chest pain      HPI:   Johana Rucker is a 68year old male with PMH DM, COPD, lung CA s/p chemo, L arm blood clot on eliquis who presents to the office for eval of cough. Cough present since his first chemo.   He has chest s

## 2018-10-30 NOTE — PROGRESS NOTES
Education Record    Learner:  Patient    Disease / Diagnosis: Lung Cancer    Barriers / Limitations:  None   Comments:    Method:  Brief focused   Comments:    General Topics:  Plan of care reviewed   Comments:    Outcome:  Shows understanding   Comments:

## 2018-11-01 NOTE — ADDENDUM NOTE
Encounter addended by: Roberta Briseno MD on: 11/1/2018 10:55 AM   Actions taken: Sign clinical note

## 2018-11-06 NOTE — PROGRESS NOTES
Education Record    Learner:  Patient    Disease / Diagnosis: PICC line dressing change    Barriers / Limitations:  None   Comments:    Method:  Brief focused   Comments:    General Topics:  Plan of care reviewed and Fall risk and prevention   Comments:

## 2018-11-13 NOTE — PROGRESS NOTES
Education Record    Learner:  Patient    Disease / Diagnosis: PICC Line Dressing Change    Barriers / Limitations:  None   Comments:    Method:  Discussion   Comments:    General Topics:  Plan of care reviewed and Fall risk and prevention   Comments:     Yvette Pratt

## 2018-11-20 NOTE — PROGRESS NOTES
Education Record    Learner:  Patient    Disease / Diagnosis: PICC line dressing change    Barriers / Limitations:  None   Comments:    Method:  Discussion   Comments:    General Topics:  Procedure, Plan of care reviewed and Fall risk and prevention   Comm

## 2018-11-30 NOTE — PROGRESS NOTES
POST-RADIATION INSTRUCTIONS:   - CALL [phone] FOR A FOLLOW-UP WITH DR. SAMM MEDRANO IN ONE MONTH  - FOLLOW-UP WITH DR Mechelle Murillo UPON COMPLETION OF RADIATION  - SIDE EFFECTS OF RADIATION WILL GRADUALLY SUBSIDE, IT MAY TAKE UP TO 2 WEEKS POST-RADIATION FOR

## 2018-12-03 NOTE — PROGRESS NOTES
[facility] Radiation Treatment Management Note 1-5    Patient:  Helena Galloway  Age:  68year old  Visit Diagnosis:    1.  Malignant neoplasm of lower lobe of right lung [facility])      Primary Rad/Onc:  Dr. Chris Pink    [facility] [place]

## 2018-12-03 NOTE — PROGRESS NOTES
Silvadene applied to right upper arm, open skin area per DOROTA Gupta. Instructions given to patient to apply silvadene 2x/day, verbalized understanding.

## 2018-12-03 NOTE — PROGRESS NOTES
POST-RADIATION INSTRUCTIONS:   - CALL [phone] FOR A FOLLOW-UP WITH DR. SAMM MEDRANO IN ONE MONTH  - FOLLOW-UP WITH DR Chio Potter UPON COMPLETION OF RADIATION  - SIDE EFFECTS OF RADIATION WILL GRADUALLY SUBSIDE, IT MAY TAKE UP TO 2 WEEKS POST-RADIATION FOR

## 2018-12-07 NOTE — PROGRESS NOTES
[facility]    PATIENT'S NAME: Ritoliset Gem   RADIATION ONCOLOGIST: Emma Hook. Renee Rea M.D.    PATIENT ACCOUNT #: [de-identified] Kiana Bailey   [facility]   MEDICAL RECORD #: [id number] YOB: 1945   DATE: 12/05/2018       RADIATION ONCOLOGY TR fractions. This was done with stereotactic ablated radiotherapy utilizing 6 MV photons. Treatments began on 11/26/2018, and completed on 12/05/2018, for a total of 10 elapsed days in which he received all 5 prescribed radiation treatments.     TREATMENT S

## 2018-12-13 NOTE — TELEPHONE ENCOUNTER
LOV 10/23/2018     Future Appointments   Date Time Provider Avis Farooq   12/18/2018  1:00 PM [address] [facility] [employer] [place]   12/26/2018  1:00 PM Veronica Miles [address] [place] [address]   12/27/2018 11:45 AM Joel Shane MD [address]   12/2

## 2018-12-17 NOTE — TELEPHONE ENCOUNTER
Just finished radiation treatments then last week started getting dizzy spells this weekend was worse could not get out to due to dizziness BP is normal He stated his left ear is bothering him. given an appointment for today with Dr Hilda Marroquin

## 2018-12-17 NOTE — TELEPHONE ENCOUNTER
Patient said he is having dizzy spells, \"walking like a drunk\" he states.     His BP pressure is 120/50-60

## 2018-12-17 NOTE — PROGRESS NOTES
Patient presents with:  Dizziness: started last week, getting worse this weekend     HPI:   Wilver Stein is a 68year old male who presents to the office for eval of vertigo. Pt with a recent lung CA diagnosis.   Undergoing radiation treatments  -symptom supplementation  No nausea, normal BP  epley handout given as well  If persists, can see ENT    2. Productive cough  ronchi at L base  Repeat Levaquin. If persists, CXR.        Juani Wyman M.D.   [facility]  12/17/18

## 2018-12-27 NOTE — PROGRESS NOTES
Nursing Follow-Up Note    Patient: Addy Sierra  YOB: 1945  Age: 68year old  Radiation Oncologist: Dr. Mcgee [facility]  Referring Physician: No ref. provider found  Chief Complaint: Patient presents with:   Follow - Up    Date: 12/27/2018

## 2018-12-27 NOTE — PATIENT INSTRUCTIONS
Benadry 25 mg (over-the counter) - take 2 capsules 1 hour prior to scan  Prednisone 50 mg (prescription) - 1st dose 13 hours prior to the scheduled scan. 2nd dose 7 hours prior to the scan. 3rd dose 1 hour prior to the scan.

## 2018-12-27 NOTE — PROGRESS NOTES
[address]    PATIENT'S NAME: Soni Carlos   RADIATION ONCOLOGIST: Ashli Muhammad. Minda Bae M.D.    PATIENT ACCOUNT #: [de-identified] Blessing Buchanan   [facility]   MEDICAL RECORD #: [id number] YOB: 1945   FOLLOW-UP DATE: 12/27/2018       RADIATION O Overall, he is feeling reasonably well but has been getting over pneumonia. In addition, he has had some significant vertigo which was attributed to vitamin B deficiency. He continues to have some cough and is on Levaquin as treatment.   He denies any hem sooner. Thank you very much for allowing me the opportunity to participate in the care of this patient. If there should be any questions regarding the radiotherapy, please feel free to contact me at any time. Dictated By Carrol Boswell M.D.  d:

## 2018-12-27 NOTE — PATIENT INSTRUCTIONS
- CALL [phone] FOR A FOLLOW-UP WITH DR. SAMM MEDRANO IN 6 MONTHS  - FOLLOW-UP WITH DR. Mary Le AS SCHEDULED  - CALL [phone] TO SCHEDULE YOUR CT SCAN   - CALL IF YOU HAVE ANY QUESTIONS/CONCERNS REGARDING RADIATION THERAPY  [phone]

## 2018-12-27 NOTE — PROGRESS NOTES
Cancer Center Progress Note    Patient Name: Charlotte Waterman   YOB: 1945   Medical Record Number: [id number]   CSN: [id number]   Attending Physician: Rory Christy M.D.    Referring Physician: Franco Portillo MD      Date of Visit: 12/27/20 adenocarcinoma c/w previously diagnosed primary lung cancer.      - case was discussed at multidisciplinary thoracic oncology clinic and consensus was to treat chest disease with chemoRT followed by SBRT to oligometastatic liver lesion.      - chemoRT with the lungs daily. , Disp: , Rfl:   •  Cholecalciferol (VITAMIN D) 2000 units Oral Cap, Take 2,000 Units by mouth daily. , Disp: , Rfl:   •  Glucosamine-Chondroit-Vit C-Mn (GLUCOSAMINE CHONDR 1500 COMPLX OR), Take 3,000 mg by mouth daily.   , Disp: , Rfl:   • Visual impairment     glasses       Past Surgical History:  Past Surgical History:   Procedure Laterality Date   • CAROTID ENDARTERECTOMY Left    • CAROTID ENDARTERECTOMY  2015   • CAROTID ENDARTERECTOMY Right 3/11/2016    Performed by Van Lao MD a Drug use: No        Comment: did speed, smoked pot in the 70's. ... none since then. Sexual activity: Not on file    Other Topics      Concerns:         Service: Not Asked        Blood Transfusions: Not Asked        Caffeine Concern:  Yes palp, no erythema  Neurological: Grossly intact.        Laboratory:  Lab Results   Component Value Date    WBC 7.8 12/26/2018    RBC 3.53 (L) 12/26/2018    HGB 11.3 (L) 12/26/2018    HCT 34.2 (L) 12/26/2018    MCV 96.9 12/26/2018    MCH 32.0 12/26/2018    M =====  CONCLUSION:  Slight improvement with resolution of basilic thrombophlebitis. There continues to be thrombus within the forearm cephalic vein.               Dictated by: Mariely Alvarado MD on 12/06/2018 at 13:36       Approved by: Mariely Alvarado MD

## 2018-12-28 NOTE — TELEPHONE ENCOUNTER
The CT would be more definitive. We suspected pneumonia, so its normal for the fluids and drainage to persist some. Let us wait and see what the CT shows.

## 2018-12-28 NOTE — TELEPHONE ENCOUNTER
Patient had a complete workup yesterday at the cancer center and they are concerned (patient would not specify the area) would like to discuss with nurse

## 2018-12-28 NOTE — TELEPHONE ENCOUNTER
Seen 12/12/18 found pneumonia put on antibiotic took meds left lung now clear but they said he has \"noise\" in his right lung he still has cough and feels SOB, (sounds breathless during conversation) wants to know if Dr Parvez Dozier wants to see him or order CX

## 2019-01-01 ENCOUNTER — OFFICE VISIT (OUTPATIENT)
Dept: HEMATOLOGY/ONCOLOGY | Facility: HOSPITAL | Age: 74
End: 2019-01-01
Attending: INTERNAL MEDICINE
Payer: COMMERCIAL

## 2019-01-01 ENCOUNTER — TELEPHONE (OUTPATIENT)
Dept: HEMATOLOGY/ONCOLOGY | Facility: HOSPITAL | Age: 74
End: 2019-01-01

## 2019-01-01 ENCOUNTER — HOSPITAL ENCOUNTER (OUTPATIENT)
Dept: NUCLEAR MEDICINE | Facility: HOSPITAL | Age: 74
Discharge: HOME | End: 2019-01-01
Attending: INTERNAL MEDICINE
Payer: COMMERCIAL

## 2019-01-01 ENCOUNTER — TELEPHONE (OUTPATIENT)
Dept: FAMILY MEDICINE CLINIC | Facility: CLINIC | Age: 74
End: 2019-01-01

## 2019-01-01 ENCOUNTER — RESEARCH ENCOUNTER (OUTPATIENT)
Dept: HEMATOLOGY/ONCOLOGY | Facility: HOSPITAL | Age: 74
End: 2019-01-01

## 2019-01-01 ENCOUNTER — HOSPITAL ENCOUNTER (OUTPATIENT)
Dept: CT IMAGING | Facility: HOSPITAL | Age: 74
Discharge: HOME | End: 2019-01-01
Attending: CLINICAL NURSE SPECIALIST
Payer: COMMERCIAL

## 2019-01-01 ENCOUNTER — HOSPITAL ENCOUNTER (OUTPATIENT)
Dept: INTERVENTIONAL RADIOLOGY/VASCULAR | Facility: HOSPITAL | Age: 74
Discharge: HOME | End: 2019-01-01
Attending: INTERNAL MEDICINE | Admitting: INTERNAL MEDICINE
Payer: COMMERCIAL

## 2019-01-01 ENCOUNTER — MOBILE ENCOUNTER (OUTPATIENT)
Dept: FAMILY MEDICINE CLINIC | Facility: CLINIC | Age: 74
End: 2019-01-01

## 2019-01-01 ENCOUNTER — HOSPITAL ENCOUNTER (OUTPATIENT)
Dept: CT IMAGING | Facility: HOSPITAL | Age: 74
Discharge: HOME | End: 2019-01-01
Attending: INTERNAL MEDICINE
Payer: COMMERCIAL

## 2019-01-01 ENCOUNTER — HOSPITAL ENCOUNTER (OUTPATIENT)
Dept: ULTRASOUND IMAGING | Facility: HOSPITAL | Age: 74
Discharge: HOME | End: 2019-01-01
Attending: NURSE PRACTITIONER
Payer: COMMERCIAL

## 2019-01-01 ENCOUNTER — MED REC SCAN ONLY (OUTPATIENT)
Dept: HEMATOLOGY/ONCOLOGY | Facility: HOSPITAL | Age: 74
End: 2019-01-01

## 2019-01-01 VITALS
WEIGHT: 171.5 LBS | TEMPERATURE: 98 F | DIASTOLIC BLOOD PRESSURE: 60 MMHG | SYSTOLIC BLOOD PRESSURE: 121 MMHG | OXYGEN SATURATION: 94 % | HEART RATE: 101 BPM | HEIGHT: 67.01 IN | BODY MASS INDEX: 26.92 KG/M2 | RESPIRATION RATE: 18 BRPM

## 2019-01-01 VITALS
SYSTOLIC BLOOD PRESSURE: 104 MMHG | DIASTOLIC BLOOD PRESSURE: 64 MMHG | HEART RATE: 114 BPM | TEMPERATURE: 99 F | RESPIRATION RATE: 20 BRPM | OXYGEN SATURATION: 95 %

## 2019-01-01 VITALS
TEMPERATURE: 99 F | DIASTOLIC BLOOD PRESSURE: 64 MMHG | SYSTOLIC BLOOD PRESSURE: 105 MMHG | OXYGEN SATURATION: 95 % | HEIGHT: 67.01 IN | HEART RATE: 106 BPM | WEIGHT: 172.5 LBS | RESPIRATION RATE: 16 BRPM | BODY MASS INDEX: 27.07 KG/M2

## 2019-01-01 VITALS
RESPIRATION RATE: 18 BRPM | TEMPERATURE: 99 F | HEART RATE: 105 BPM | OXYGEN SATURATION: 97 % | DIASTOLIC BLOOD PRESSURE: 68 MMHG | SYSTOLIC BLOOD PRESSURE: 104 MMHG

## 2019-01-01 VITALS
TEMPERATURE: 97 F | RESPIRATION RATE: 20 BRPM | HEART RATE: 83 BPM | OXYGEN SATURATION: 92 % | WEIGHT: 174.5 LBS | SYSTOLIC BLOOD PRESSURE: 129 MMHG | HEIGHT: 67.01 IN | BODY MASS INDEX: 27.39 KG/M2 | DIASTOLIC BLOOD PRESSURE: 65 MMHG

## 2019-01-01 VITALS
WEIGHT: 168.5 LBS | SYSTOLIC BLOOD PRESSURE: 121 MMHG | RESPIRATION RATE: 18 BRPM | DIASTOLIC BLOOD PRESSURE: 75 MMHG | BODY MASS INDEX: 26.45 KG/M2 | OXYGEN SATURATION: 95 % | TEMPERATURE: 99 F | HEART RATE: 97 BPM | HEIGHT: 67.01 IN

## 2019-01-01 VITALS
DIASTOLIC BLOOD PRESSURE: 90 MMHG | RESPIRATION RATE: 20 BRPM | SYSTOLIC BLOOD PRESSURE: 162 MMHG | HEART RATE: 102 BPM | WEIGHT: 168.19 LBS | BODY MASS INDEX: 26 KG/M2 | OXYGEN SATURATION: 97 % | TEMPERATURE: 98 F

## 2019-01-01 VITALS
DIASTOLIC BLOOD PRESSURE: 75 MMHG | RESPIRATION RATE: 20 BRPM | HEART RATE: 85 BPM | BODY MASS INDEX: 27 KG/M2 | OXYGEN SATURATION: 93 % | WEIGHT: 171 LBS | SYSTOLIC BLOOD PRESSURE: 155 MMHG | TEMPERATURE: 99 F

## 2019-01-01 VITALS
DIASTOLIC BLOOD PRESSURE: 61 MMHG | RESPIRATION RATE: 18 BRPM | TEMPERATURE: 99 F | OXYGEN SATURATION: 95 % | HEART RATE: 102 BPM | SYSTOLIC BLOOD PRESSURE: 89 MMHG

## 2019-01-01 VITALS
WEIGHT: 169.5 LBS | RESPIRATION RATE: 18 BRPM | TEMPERATURE: 99 F | DIASTOLIC BLOOD PRESSURE: 63 MMHG | HEIGHT: 67.01 IN | SYSTOLIC BLOOD PRESSURE: 103 MMHG | OXYGEN SATURATION: 96 % | HEART RATE: 107 BPM | BODY MASS INDEX: 26.6 KG/M2

## 2019-01-01 VITALS
TEMPERATURE: 99 F | OXYGEN SATURATION: 96 % | RESPIRATION RATE: 18 BRPM | DIASTOLIC BLOOD PRESSURE: 72 MMHG | HEART RATE: 101 BPM | SYSTOLIC BLOOD PRESSURE: 134 MMHG

## 2019-01-01 VITALS
WEIGHT: 163.5 LBS | RESPIRATION RATE: 20 BRPM | DIASTOLIC BLOOD PRESSURE: 58 MMHG | OXYGEN SATURATION: 96 % | BODY MASS INDEX: 25.66 KG/M2 | SYSTOLIC BLOOD PRESSURE: 97 MMHG | HEART RATE: 115 BPM | TEMPERATURE: 97 F | HEIGHT: 67.01 IN

## 2019-01-01 VITALS
RESPIRATION RATE: 18 BRPM | DIASTOLIC BLOOD PRESSURE: 70 MMHG | SYSTOLIC BLOOD PRESSURE: 114 MMHG | OXYGEN SATURATION: 97 % | HEART RATE: 103 BPM | TEMPERATURE: 98 F

## 2019-01-01 VITALS
OXYGEN SATURATION: 97 % | SYSTOLIC BLOOD PRESSURE: 145 MMHG | HEART RATE: 90 BPM | DIASTOLIC BLOOD PRESSURE: 69 MMHG | RESPIRATION RATE: 20 BRPM | TEMPERATURE: 99 F

## 2019-01-01 VITALS
RESPIRATION RATE: 16 BRPM | BODY MASS INDEX: 26.6 KG/M2 | WEIGHT: 169.5 LBS | OXYGEN SATURATION: 95 % | TEMPERATURE: 99 F | HEART RATE: 96 BPM | HEIGHT: 67.01 IN | SYSTOLIC BLOOD PRESSURE: 130 MMHG | DIASTOLIC BLOOD PRESSURE: 81 MMHG

## 2019-01-01 VITALS
OXYGEN SATURATION: 98 % | RESPIRATION RATE: 18 BRPM | DIASTOLIC BLOOD PRESSURE: 60 MMHG | WEIGHT: 173 LBS | TEMPERATURE: 98 F | HEART RATE: 99 BPM | BODY MASS INDEX: 27 KG/M2 | SYSTOLIC BLOOD PRESSURE: 113 MMHG

## 2019-01-01 VITALS
RESPIRATION RATE: 20 BRPM | TEMPERATURE: 99 F | HEART RATE: 109 BPM | DIASTOLIC BLOOD PRESSURE: 65 MMHG | HEIGHT: 67.01 IN | SYSTOLIC BLOOD PRESSURE: 103 MMHG | WEIGHT: 172 LBS | BODY MASS INDEX: 27 KG/M2 | OXYGEN SATURATION: 95 %

## 2019-01-01 VITALS
SYSTOLIC BLOOD PRESSURE: 98 MMHG | OXYGEN SATURATION: 93 % | DIASTOLIC BLOOD PRESSURE: 51 MMHG | RESPIRATION RATE: 18 BRPM | TEMPERATURE: 99 F | HEART RATE: 96 BPM

## 2019-01-01 VITALS
DIASTOLIC BLOOD PRESSURE: 95 MMHG | HEART RATE: 63 BPM | RESPIRATION RATE: 18 BRPM | SYSTOLIC BLOOD PRESSURE: 164 MMHG | TEMPERATURE: 97 F | OXYGEN SATURATION: 96 %

## 2019-01-01 VITALS
DIASTOLIC BLOOD PRESSURE: 56 MMHG | SYSTOLIC BLOOD PRESSURE: 109 MMHG | HEART RATE: 82 BPM | OXYGEN SATURATION: 95 % | WEIGHT: 172.63 LBS | HEIGHT: 67.01 IN | BODY MASS INDEX: 27.09 KG/M2 | RESPIRATION RATE: 18 BRPM

## 2019-01-01 VITALS
BODY MASS INDEX: 27 KG/M2 | HEART RATE: 104 BPM | TEMPERATURE: 101 F | SYSTOLIC BLOOD PRESSURE: 116 MMHG | HEIGHT: 67.01 IN | OXYGEN SATURATION: 99 % | DIASTOLIC BLOOD PRESSURE: 72 MMHG | RESPIRATION RATE: 18 BRPM

## 2019-01-01 VITALS
DIASTOLIC BLOOD PRESSURE: 52 MMHG | OXYGEN SATURATION: 97 % | HEART RATE: 88 BPM | RESPIRATION RATE: 20 BRPM | SYSTOLIC BLOOD PRESSURE: 84 MMHG | BODY MASS INDEX: 26 KG/M2 | HEIGHT: 67.01 IN | TEMPERATURE: 100 F

## 2019-01-01 VITALS
DIASTOLIC BLOOD PRESSURE: 72 MMHG | OXYGEN SATURATION: 96 % | HEART RATE: 83 BPM | RESPIRATION RATE: 22 BRPM | SYSTOLIC BLOOD PRESSURE: 116 MMHG

## 2019-01-01 DIAGNOSIS — E86.0 DEHYDRATION: Primary | ICD-10-CM

## 2019-01-01 DIAGNOSIS — Z71.9 ENCOUNTER FOR HEALTH EDUCATION: ICD-10-CM

## 2019-01-01 DIAGNOSIS — C34.01 MALIGNANT NEOPLASM OF HILUS OF RIGHT LUNG (HCC): Primary | ICD-10-CM

## 2019-01-01 DIAGNOSIS — T45.1X5D CHEMOTHERAPY ADVERSE REACTION, SUBSEQUENT ENCOUNTER: ICD-10-CM

## 2019-01-01 DIAGNOSIS — R74.8 ELEVATED LIVER ENZYMES: ICD-10-CM

## 2019-01-01 DIAGNOSIS — C78.7 LIVER METASTASES (HCC): ICD-10-CM

## 2019-01-01 DIAGNOSIS — T45.1X5A ADVERSE EFFECT OF CHEMOTHERAPY, INITIAL ENCOUNTER: ICD-10-CM

## 2019-01-01 DIAGNOSIS — M54.50 ACUTE RIGHT-SIDED LOW BACK PAIN WITHOUT SCIATICA: ICD-10-CM

## 2019-01-01 DIAGNOSIS — K76.9 LIVER LESION: ICD-10-CM

## 2019-01-01 DIAGNOSIS — R79.89 ELEVATED LFTS: ICD-10-CM

## 2019-01-01 DIAGNOSIS — T45.1X5A CHEMOTHERAPY-INDUCED NEUROPATHY (HCC): ICD-10-CM

## 2019-01-01 DIAGNOSIS — E86.0 DEHYDRATION: ICD-10-CM

## 2019-01-01 DIAGNOSIS — R11.0 NAUSEA: ICD-10-CM

## 2019-01-01 DIAGNOSIS — D64.81 ANEMIA ASSOCIATED WITH CHEMOTHERAPY: ICD-10-CM

## 2019-01-01 DIAGNOSIS — R06.00 ACUTE DYSPNEA: ICD-10-CM

## 2019-01-01 DIAGNOSIS — I10 ESSENTIAL HYPERTENSION, BENIGN: ICD-10-CM

## 2019-01-01 DIAGNOSIS — N18.2 TYPE 2 DIABETES MELLITUS WITH STAGE 2 CHRONIC KIDNEY DISEASE, WITHOUT LONG-TERM CURRENT USE OF INSULIN (HCC): Primary | ICD-10-CM

## 2019-01-01 DIAGNOSIS — E11.22 TYPE 2 DIABETES MELLITUS WITH STAGE 2 CHRONIC KIDNEY DISEASE, WITHOUT LONG-TERM CURRENT USE OF INSULIN (HCC): Primary | ICD-10-CM

## 2019-01-01 DIAGNOSIS — D69.6 THROMBOCYTOPENIA (HCC): ICD-10-CM

## 2019-01-01 DIAGNOSIS — D64.9 NORMOCYTIC ANEMIA: ICD-10-CM

## 2019-01-01 DIAGNOSIS — C34.01 MALIGNANT NEOPLASM OF HILUS OF RIGHT LUNG (HCC): ICD-10-CM

## 2019-01-01 DIAGNOSIS — C34.81 MALIGNANT NEOPLASM OF OVERLAPPING SITES OF RIGHT LUNG (HCC): ICD-10-CM

## 2019-01-01 DIAGNOSIS — C34.90 PRIMARY MALIGNANT NEOPLASM OF LUNG METASTATIC TO OTHER SITE, UNSPECIFIED LATERALITY (HCC): Primary | ICD-10-CM

## 2019-01-01 DIAGNOSIS — G89.3 NEOPLASM RELATED PAIN: ICD-10-CM

## 2019-01-01 DIAGNOSIS — Z91.041 ALLERGY TO IMAGING CONTRAST MEDIA: Primary | ICD-10-CM

## 2019-01-01 DIAGNOSIS — C34.91 BRONCHOGENIC CANCER OF RIGHT LUNG (HCC): Primary | ICD-10-CM

## 2019-01-01 DIAGNOSIS — R10.11 RIGHT UPPER QUADRANT PAIN: ICD-10-CM

## 2019-01-01 DIAGNOSIS — Z51.11 ENCOUNTER FOR CHEMOTHERAPY MANAGEMENT: ICD-10-CM

## 2019-01-01 DIAGNOSIS — T45.1X5A ANEMIA ASSOCIATED WITH CHEMOTHERAPY: ICD-10-CM

## 2019-01-01 DIAGNOSIS — C34.91 BRONCHOGENIC LUNG CANCER, RIGHT (HCC): ICD-10-CM

## 2019-01-01 DIAGNOSIS — R09.02 HYPOXIA: ICD-10-CM

## 2019-01-01 DIAGNOSIS — I65.23 BILATERAL CAROTID ARTERY STENOSIS: ICD-10-CM

## 2019-01-01 DIAGNOSIS — N17.9 AKI (ACUTE KIDNEY INJURY) (HCC): ICD-10-CM

## 2019-01-01 DIAGNOSIS — C34.91 BRONCHOGENIC LUNG CANCER, RIGHT (HCC): Primary | ICD-10-CM

## 2019-01-01 DIAGNOSIS — G62.0 CHEMOTHERAPY-INDUCED NEUROPATHY (HCC): ICD-10-CM

## 2019-01-01 DIAGNOSIS — R05.9 COUGH IN ADULT: ICD-10-CM

## 2019-01-01 DIAGNOSIS — C34.90 PRIMARY MALIGNANT NEOPLASM OF LUNG METASTATIC TO OTHER SITE, UNSPECIFIED LATERALITY (HCC): ICD-10-CM

## 2019-01-01 DIAGNOSIS — R42 VERTIGO: ICD-10-CM

## 2019-01-01 DIAGNOSIS — T45.1X5A ADVERSE EFFECT OF CHEMOTHERAPY, INITIAL ENCOUNTER: Primary | ICD-10-CM

## 2019-01-01 DIAGNOSIS — R09.02 HYPOXIA: Primary | ICD-10-CM

## 2019-01-01 DIAGNOSIS — R43.2 ALTERED TASTE: ICD-10-CM

## 2019-01-01 DIAGNOSIS — C34.81 MALIGNANT NEOPLASM OF OVERLAPPING SITES OF RIGHT LUNG (HCC): Primary | ICD-10-CM

## 2019-01-01 DIAGNOSIS — E87.5 HYPERKALEMIA: ICD-10-CM

## 2019-01-01 DIAGNOSIS — R79.89 ELEVATED SERUM CREATININE: ICD-10-CM

## 2019-01-01 DIAGNOSIS — I95.89 OTHER HYPOTENSION: ICD-10-CM

## 2019-01-01 DIAGNOSIS — N17.9 ACUTE KIDNEY INJURY (HCC): ICD-10-CM

## 2019-01-01 DIAGNOSIS — E78.00 PURE HYPERCHOLESTEROLEMIA: ICD-10-CM

## 2019-01-01 DIAGNOSIS — G89.3 NEOPLASM RELATED PAIN: Primary | ICD-10-CM

## 2019-01-01 DIAGNOSIS — R53.83 FATIGUE DUE TO TREATMENT: ICD-10-CM

## 2019-01-01 DIAGNOSIS — C34.91 BRONCHOGENIC CANCER OF RIGHT LUNG (HCC): ICD-10-CM

## 2019-01-01 LAB
ALBUMIN SERPL-MCNC: 2.4 G/DL (ref 3.4–5)
ALBUMIN SERPL-MCNC: 2.6 G/DL (ref 3.4–5)
ALBUMIN SERPL-MCNC: 3.1 G/DL (ref 3.4–5)
ALBUMIN SERPL-MCNC: 3.2 G/DL (ref 3.4–5)
ALBUMIN SERPL-MCNC: 3.5 G/DL (ref 3.4–5)
ALBUMIN SERPL-MCNC: 3.5 G/DL (ref 3.4–5)
ALBUMIN/GLOB SERPL: 0.6 {RATIO} (ref 1–2)
ALBUMIN/GLOB SERPL: 0.6 {RATIO} (ref 1–2)
ALBUMIN/GLOB SERPL: 0.8 {RATIO} (ref 1–2)
ALBUMIN/GLOB SERPL: 0.9 {RATIO} (ref 1–2)
ALP LIVER SERPL-CCNC: 134 U/L (ref 45–117)
ALP LIVER SERPL-CCNC: 180 U/L (ref 45–117)
ALP LIVER SERPL-CCNC: 203 U/L (ref 45–117)
ALP LIVER SERPL-CCNC: 245 U/L (ref 45–117)
ALP LIVER SERPL-CCNC: 733 U/L (ref 45–117)
ALP LIVER SERPL-CCNC: 915 U/L (ref 45–117)
ALT SERPL-CCNC: 124 U/L (ref 16–61)
ALT SERPL-CCNC: 177 U/L (ref 16–61)
ALT SERPL-CCNC: 42 U/L (ref 16–61)
ALT SERPL-CCNC: 58 U/L (ref 16–61)
ALT SERPL-CCNC: 58 U/L (ref 16–61)
ALT SERPL-CCNC: 73 U/L (ref 16–61)
ANION GAP SERPL CALC-SCNC: 12 MMOL/L (ref 0–18)
ANION GAP SERPL CALC-SCNC: 6 MMOL/L (ref 0–18)
ANION GAP SERPL CALC-SCNC: 6 MMOL/L (ref 0–18)
ANION GAP SERPL CALC-SCNC: 7 MMOL/L (ref 0–18)
ANION GAP SERPL CALC-SCNC: 8 MMOL/L (ref 0–18)
ANION GAP SERPL CALC-SCNC: 8 MMOL/L (ref 0–18)
ANION GAP SERPL CALC-SCNC: 9 MMOL/L (ref 0–18)
ANION GAP SERPL CALC-SCNC: 9 MMOL/L (ref 0–18)
AST SERPL-CCNC: 187 U/L (ref 15–37)
AST SERPL-CCNC: 213 U/L (ref 15–37)
AST SERPL-CCNC: 29 U/L (ref 15–37)
AST SERPL-CCNC: 35 U/L (ref 15–37)
AST SERPL-CCNC: 44 U/L (ref 15–37)
AST SERPL-CCNC: 55 U/L (ref 15–37)
BASOPHILS # BLD AUTO: 0.02 X10(3) UL (ref 0–0.2)
BASOPHILS # BLD AUTO: 0.03 X10(3) UL (ref 0–0.2)
BASOPHILS # BLD AUTO: 0.04 X10(3) UL (ref 0–0.2)
BASOPHILS # BLD AUTO: 0.04 X10(3) UL (ref 0–0.2)
BASOPHILS # BLD AUTO: 0.05 X10(3) UL (ref 0–0.2)
BASOPHILS NFR BLD AUTO: 0.3 %
BASOPHILS NFR BLD AUTO: 0.4 %
BASOPHILS NFR BLD AUTO: 0.5 %
BASOPHILS NFR BLD AUTO: 0.6 %
BASOPHILS NFR BLD AUTO: 0.7 %
BASOPHILS NFR BLD AUTO: 1 %
BASOPHILS NFR BLD AUTO: 1.2 %
BILIRUB SERPL-MCNC: 0.4 MG/DL (ref 0.1–2)
BILIRUB SERPL-MCNC: 1 MG/DL (ref 0.1–2)
BILIRUB SERPL-MCNC: 2.5 MG/DL (ref 0.1–2)
BUN BLD-MCNC: 17 MG/DL (ref 7–18)
BUN BLD-MCNC: 22 MG/DL (ref 7–18)
BUN BLD-MCNC: 23 MG/DL (ref 7–18)
BUN BLD-MCNC: 25 MG/DL (ref 7–18)
BUN BLD-MCNC: 26 MG/DL (ref 7–18)
BUN BLD-MCNC: 27 MG/DL (ref 7–18)
BUN BLD-MCNC: 27 MG/DL (ref 7–18)
BUN BLD-MCNC: 29 MG/DL (ref 7–18)
BUN BLD-MCNC: 30 MG/DL (ref 7–18)
BUN BLD-MCNC: 35 MG/DL (ref 7–18)
BUN BLD-MCNC: 57 MG/DL (ref 7–18)
BUN/CREAT SERPL: 14.2 (ref 10–20)
BUN/CREAT SERPL: 14.2 (ref 10–20)
BUN/CREAT SERPL: 16.9 (ref 10–20)
BUN/CREAT SERPL: 20 (ref 10–20)
BUN/CREAT SERPL: 20.8 (ref 10–20)
BUN/CREAT SERPL: 22.3 (ref 10–20)
BUN/CREAT SERPL: 22.4 (ref 10–20)
BUN/CREAT SERPL: 23.6 (ref 10–20)
BUN/CREAT SERPL: 24.6 (ref 10–20)
BUN/CREAT SERPL: 25.7 (ref 10–20)
BUN/CREAT SERPL: 30.2 (ref 10–20)
CALCIUM BLD-MCNC: 7.9 MG/DL (ref 8.5–10.1)
CALCIUM BLD-MCNC: 8.2 MG/DL (ref 8.5–10.1)
CALCIUM BLD-MCNC: 8.2 MG/DL (ref 8.5–10.1)
CALCIUM BLD-MCNC: 8.3 MG/DL (ref 8.5–10.1)
CALCIUM BLD-MCNC: 8.3 MG/DL (ref 8.5–10.1)
CALCIUM BLD-MCNC: 8.4 MG/DL (ref 8.5–10.1)
CALCIUM BLD-MCNC: 8.7 MG/DL (ref 8.5–10.1)
CALCIUM BLD-MCNC: 8.7 MG/DL (ref 8.5–10.1)
CALCIUM BLD-MCNC: 8.8 MG/DL (ref 8.5–10.1)
CALCIUM BLD-MCNC: 8.9 MG/DL (ref 8.5–10.1)
CALCIUM BLD-MCNC: 9.3 MG/DL (ref 8.5–10.1)
CEA SERPL-MCNC: 4.4 NG/ML (ref ?–5)
CHLORIDE SERPL-SCNC: 101 MMOL/L (ref 98–112)
CHLORIDE SERPL-SCNC: 103 MMOL/L (ref 98–112)
CHLORIDE SERPL-SCNC: 106 MMOL/L (ref 98–107)
CHLORIDE SERPL-SCNC: 107 MMOL/L (ref 98–107)
CHLORIDE SERPL-SCNC: 107 MMOL/L (ref 98–107)
CHLORIDE SERPL-SCNC: 108 MMOL/L (ref 98–107)
CHLORIDE SERPL-SCNC: 108 MMOL/L (ref 98–107)
CHLORIDE SERPL-SCNC: 111 MMOL/L (ref 98–107)
CHLORIDE SERPL-SCNC: 113 MMOL/L (ref 98–107)
CHLORIDE SERPL-SCNC: 114 MMOL/L (ref 98–107)
CHLORIDE SERPL-SCNC: 115 MMOL/L (ref 98–107)
CO2 SERPL-SCNC: 19 MMOL/L (ref 21–32)
CO2 SERPL-SCNC: 19 MMOL/L (ref 21–32)
CO2 SERPL-SCNC: 20 MMOL/L (ref 21–32)
CO2 SERPL-SCNC: 21 MMOL/L (ref 21–32)
CO2 SERPL-SCNC: 22 MMOL/L (ref 21–32)
CO2 SERPL-SCNC: 23 MMOL/L (ref 21–32)
CREAT BLD-MCNC: 1.15 MG/DL (ref 0.7–1.3)
CREAT BLD-MCNC: 1.16 MG/DL (ref 0.7–1.3)
CREAT BLD-MCNC: 1.18 MG/DL (ref 0.7–1.3)
CREAT BLD-MCNC: 1.2 MG/DL (ref 0.7–1.3)
CREAT BLD-MCNC: 1.21 MG/DL (ref 0.7–1.3)
CREAT BLD-MCNC: 1.27 MG/DL (ref 0.7–1.3)
CREAT BLD-MCNC: 1.3 MG/DL (ref 0.7–1.3)
CREAT BLD-MCNC: 1.3 MG/DL (ref 0.7–1.3)
CREAT BLD-MCNC: 1.36 MG/DL (ref 0.7–1.3)
CREAT BLD-MCNC: 1.76 MG/DL (ref 0.7–1.3)
CREAT BLD-MCNC: 1.89 MG/DL (ref 0.7–1.3)
DEPRECATED RDW RBC AUTO: 46 FL (ref 35.1–46.3)
DEPRECATED RDW RBC AUTO: 47.7 FL (ref 35.1–46.3)
DEPRECATED RDW RBC AUTO: 48.3 FL (ref 35.1–46.3)
DEPRECATED RDW RBC AUTO: 49.4 FL (ref 35.1–46.3)
DEPRECATED RDW RBC AUTO: 51.1 FL (ref 35.1–46.3)
DEPRECATED RDW RBC AUTO: 51.8 FL (ref 35.1–46.3)
DEPRECATED RDW RBC AUTO: 51.9 FL (ref 35.1–46.3)
DEPRECATED RDW RBC AUTO: 57.3 FL (ref 35.1–46.3)
DEPRECATED RDW RBC AUTO: 58.4 FL (ref 35.1–46.3)
EOSINOPHIL # BLD AUTO: 0.01 X10(3) UL (ref 0–0.7)
EOSINOPHIL # BLD AUTO: 0.06 X10(3) UL (ref 0–0.7)
EOSINOPHIL # BLD AUTO: 0.17 X10(3) UL (ref 0–0.7)
EOSINOPHIL # BLD AUTO: 0.19 X10(3) UL (ref 0–0.7)
EOSINOPHIL # BLD AUTO: 0.23 X10(3) UL (ref 0–0.7)
EOSINOPHIL # BLD AUTO: 0.26 X10(3) UL (ref 0–0.7)
EOSINOPHIL # BLD AUTO: 0.29 X10(3) UL (ref 0–0.7)
EOSINOPHIL # BLD AUTO: 0.34 X10(3) UL (ref 0–0.7)
EOSINOPHIL # BLD AUTO: 0.37 X10(3) UL (ref 0–0.7)
EOSINOPHIL NFR BLD AUTO: 0.1 %
EOSINOPHIL NFR BLD AUTO: 1.4 %
EOSINOPHIL NFR BLD AUTO: 2.8 %
EOSINOPHIL NFR BLD AUTO: 3.7 %
EOSINOPHIL NFR BLD AUTO: 5.5 %
EOSINOPHIL NFR BLD AUTO: 5.6 %
EOSINOPHIL NFR BLD AUTO: 5.7 %
EOSINOPHIL NFR BLD AUTO: 7.1 %
EOSINOPHIL NFR BLD AUTO: 9.3 %
ERYTHROCYTE [DISTWIDTH] IN BLOOD BY AUTOMATED COUNT: 13.5 % (ref 11–15)
ERYTHROCYTE [DISTWIDTH] IN BLOOD BY AUTOMATED COUNT: 13.9 % (ref 11–15)
ERYTHROCYTE [DISTWIDTH] IN BLOOD BY AUTOMATED COUNT: 14.1 % (ref 11–15)
ERYTHROCYTE [DISTWIDTH] IN BLOOD BY AUTOMATED COUNT: 14.4 % (ref 11–15)
ERYTHROCYTE [DISTWIDTH] IN BLOOD BY AUTOMATED COUNT: 14.5 % (ref 11–15)
ERYTHROCYTE [DISTWIDTH] IN BLOOD BY AUTOMATED COUNT: 14.6 % (ref 11–15)
ERYTHROCYTE [DISTWIDTH] IN BLOOD BY AUTOMATED COUNT: 14.6 % (ref 11–15)
ERYTHROCYTE [DISTWIDTH] IN BLOOD BY AUTOMATED COUNT: 15.5 % (ref 11–15)
ERYTHROCYTE [DISTWIDTH] IN BLOOD BY AUTOMATED COUNT: 16 % (ref 11–15)
GLOBULIN PLAS-MCNC: 3.4 G/DL (ref 2.8–4.4)
GLOBULIN PLAS-MCNC: 3.9 G/DL (ref 2.8–4.4)
GLOBULIN PLAS-MCNC: 4 G/DL (ref 2.8–4.4)
GLOBULIN PLAS-MCNC: 4 G/DL (ref 2.8–4.4)
GLOBULIN PLAS-MCNC: 4.1 G/DL (ref 2.8–4.4)
GLOBULIN PLAS-MCNC: 4.2 G/DL (ref 2.8–4.4)
GLUCOSE BLD-MCNC: 113 MG/DL (ref 70–99)
GLUCOSE BLD-MCNC: 121 MG/DL (ref 70–99)
GLUCOSE BLD-MCNC: 126 MG/DL (ref 70–99)
GLUCOSE BLD-MCNC: 126 MG/DL (ref 70–99)
GLUCOSE BLD-MCNC: 131 MG/DL (ref 70–99)
GLUCOSE BLD-MCNC: 133 MG/DL (ref 70–99)
GLUCOSE BLD-MCNC: 133 MG/DL (ref 70–99)
GLUCOSE BLD-MCNC: 139 MG/DL (ref 70–99)
GLUCOSE BLD-MCNC: 148 MG/DL (ref 70–99)
GLUCOSE BLD-MCNC: 154 MG/DL (ref 70–99)
GLUCOSE BLD-MCNC: 156 MG/DL (ref 70–99)
GLUCOSE BLD-MCNC: 162 MG/DL (ref 70–99)
HAV IGM SER QL: 1.7 MG/DL (ref 1.6–2.6)
HAV IGM SER QL: 1.8 MG/DL (ref 1.6–2.6)
HAV IGM SER QL: 1.8 MG/DL (ref 1.6–2.6)
HAV IGM SER QL: 1.9 MG/DL (ref 1.6–2.6)
HAV IGM SER QL: 2.1 MG/DL (ref 1.6–2.6)
HCT VFR BLD AUTO: 26.4 % (ref 39–53)
HCT VFR BLD AUTO: 29 % (ref 39–53)
HCT VFR BLD AUTO: 31.4 % (ref 39–53)
HCT VFR BLD AUTO: 33.1 % (ref 39–53)
HCT VFR BLD AUTO: 33.1 % (ref 39–53)
HCT VFR BLD AUTO: 35.3 % (ref 39–53)
HCT VFR BLD AUTO: 35.5 % (ref 39–53)
HCT VFR BLD AUTO: 35.6 % (ref 39–53)
HCT VFR BLD AUTO: 35.9 % (ref 39–53)
HGB BLD-MCNC: 11.1 G/DL (ref 13–17.5)
HGB BLD-MCNC: 11.4 G/DL (ref 13–17.5)
HGB BLD-MCNC: 11.5 G/DL (ref 13–17.5)
HGB BLD-MCNC: 11.9 G/DL (ref 13–17.5)
HGB BLD-MCNC: 11.9 G/DL (ref 13–17.5)
HGB BLD-MCNC: 12 G/DL (ref 13–17.5)
HGB BLD-MCNC: 12.4 G/DL (ref 13–17.5)
HGB BLD-MCNC: 8.7 G/DL (ref 13–17.5)
HGB BLD-MCNC: 9.4 G/DL (ref 13–17.5)
IMM GRANULOCYTES # BLD AUTO: 0.02 X10(3) UL (ref 0–1)
IMM GRANULOCYTES # BLD AUTO: 0.03 X10(3) UL (ref 0–1)
IMM GRANULOCYTES # BLD AUTO: 0.04 X10(3) UL (ref 0–1)
IMM GRANULOCYTES # BLD AUTO: 0.04 X10(3) UL (ref 0–1)
IMM GRANULOCYTES # BLD AUTO: 0.06 X10(3) UL (ref 0–1)
IMM GRANULOCYTES # BLD AUTO: 0.06 X10(3) UL (ref 0–1)
IMM GRANULOCYTES # BLD AUTO: 0.16 X10(3) UL (ref 0–1)
IMM GRANULOCYTES # BLD AUTO: 0.19 X10(3) UL (ref 0–1)
IMM GRANULOCYTES # BLD AUTO: 0.23 X10(3) UL (ref 0–1)
IMM GRANULOCYTES NFR BLD: 0.5 %
IMM GRANULOCYTES NFR BLD: 0.6 %
IMM GRANULOCYTES NFR BLD: 0.7 %
IMM GRANULOCYTES NFR BLD: 0.8 %
IMM GRANULOCYTES NFR BLD: 1 %
IMM GRANULOCYTES NFR BLD: 1.6 %
IMM GRANULOCYTES NFR BLD: 1.8 %
IMM GRANULOCYTES NFR BLD: 4.4 %
IMM GRANULOCYTES NFR BLD: 4.5 %
LDH SERPL L TO P-CCNC: 312 U/L (ref 87–241)
LDH SERPL L TO P-CCNC: 579 U/L (ref 87–241)
LDH SERPL L TO P-CCNC: >4000 U/L (ref 87–241)
LDH SERPL L TO P-CCNC: >4000 U/L (ref 87–241)
LYMPHOCYTES # BLD AUTO: 0.41 X10(3) UL (ref 1–4)
LYMPHOCYTES # BLD AUTO: 0.5 X10(3) UL (ref 1–4)
LYMPHOCYTES # BLD AUTO: 0.54 X10(3) UL (ref 1–4)
LYMPHOCYTES # BLD AUTO: 0.55 X10(3) UL (ref 1–4)
LYMPHOCYTES # BLD AUTO: 0.61 X10(3) UL (ref 1–4)
LYMPHOCYTES # BLD AUTO: 0.65 X10(3) UL (ref 1–4)
LYMPHOCYTES # BLD AUTO: 0.72 X10(3) UL (ref 1–4)
LYMPHOCYTES # BLD AUTO: 0.72 X10(3) UL (ref 1–4)
LYMPHOCYTES # BLD AUTO: 0.86 X10(3) UL (ref 1–4)
LYMPHOCYTES NFR BLD AUTO: 11.7 %
LYMPHOCYTES NFR BLD AUTO: 12.8 %
LYMPHOCYTES NFR BLD AUTO: 13.2 %
LYMPHOCYTES NFR BLD AUTO: 14 %
LYMPHOCYTES NFR BLD AUTO: 16.6 %
LYMPHOCYTES NFR BLD AUTO: 17.9 %
LYMPHOCYTES NFR BLD AUTO: 6.2 %
LYMPHOCYTES NFR BLD AUTO: 9 %
LYMPHOCYTES NFR BLD AUTO: 9.5 %
M PROTEIN MFR SERPL ELPH: 6.5 G/DL (ref 6.4–8.2)
M PROTEIN MFR SERPL ELPH: 6.6 G/DL (ref 6.4–8.2)
M PROTEIN MFR SERPL ELPH: 6.7 G/DL (ref 6.4–8.2)
M PROTEIN MFR SERPL ELPH: 7.2 G/DL (ref 6.4–8.2)
M PROTEIN MFR SERPL ELPH: 7.4 G/DL (ref 6.4–8.2)
M PROTEIN MFR SERPL ELPH: 7.5 G/DL (ref 6.4–8.2)
MCH RBC QN AUTO: 32.4 PG (ref 26–34)
MCH RBC QN AUTO: 32.7 PG (ref 26–34)
MCH RBC QN AUTO: 32.8 PG (ref 26–34)
MCH RBC QN AUTO: 32.9 PG (ref 26–34)
MCH RBC QN AUTO: 33 PG (ref 26–34)
MCH RBC QN AUTO: 33.1 PG (ref 26–34)
MCH RBC QN AUTO: 33.1 PG (ref 26–34)
MCH RBC QN AUTO: 33.5 PG (ref 26–34)
MCH RBC QN AUTO: 33.7 PG (ref 26–34)
MCHC RBC AUTO-ENTMCNC: 32.4 G/DL (ref 31–37)
MCHC RBC AUTO-ENTMCNC: 33 G/DL (ref 31–37)
MCHC RBC AUTO-ENTMCNC: 33.4 G/DL (ref 31–37)
MCHC RBC AUTO-ENTMCNC: 33.4 G/DL (ref 31–37)
MCHC RBC AUTO-ENTMCNC: 33.7 G/DL (ref 31–37)
MCHC RBC AUTO-ENTMCNC: 34.4 G/DL (ref 31–37)
MCHC RBC AUTO-ENTMCNC: 34.7 G/DL (ref 31–37)
MCHC RBC AUTO-ENTMCNC: 34.9 G/DL (ref 31–37)
MCHC RBC AUTO-ENTMCNC: 35.4 G/DL (ref 31–37)
MCV RBC AUTO: 102.3 FL (ref 80–100)
MCV RBC AUTO: 103.2 FL (ref 80–100)
MCV RBC AUTO: 92.9 FL (ref 80–100)
MCV RBC AUTO: 94.2 FL (ref 80–100)
MCV RBC AUTO: 95.1 FL (ref 80–100)
MCV RBC AUTO: 96.2 FL (ref 80–100)
MCV RBC AUTO: 97 FL (ref 80–100)
MCV RBC AUTO: 97 FL (ref 80–100)
MCV RBC AUTO: 98.9 FL (ref 80–100)
MONOCYTES # BLD AUTO: 0.09 X10(3) UL (ref 0.1–1)
MONOCYTES # BLD AUTO: 0.2 X10(3) UL (ref 0.1–1)
MONOCYTES # BLD AUTO: 0.76 X10(3) UL (ref 0.1–1)
MONOCYTES # BLD AUTO: 0.78 X10(3) UL (ref 0.1–1)
MONOCYTES # BLD AUTO: 0.99 X10(3) UL (ref 0.1–1)
MONOCYTES # BLD AUTO: 1.1 X10(3) UL (ref 0.1–1)
MONOCYTES # BLD AUTO: 1.18 X10(3) UL (ref 0.1–1)
MONOCYTES # BLD AUTO: 1.31 X10(3) UL (ref 0.1–1)
MONOCYTES # BLD AUTO: 1.4 X10(3) UL (ref 0.1–1)
MONOCYTES NFR BLD AUTO: 12 %
MONOCYTES NFR BLD AUTO: 12.3 %
MONOCYTES NFR BLD AUTO: 14.3 %
MONOCYTES NFR BLD AUTO: 19.4 %
MONOCYTES NFR BLD AUTO: 25.4 %
MONOCYTES NFR BLD AUTO: 25.8 %
MONOCYTES NFR BLD AUTO: 3.2 %
MONOCYTES NFR BLD AUTO: 3.9 %
MONOCYTES NFR BLD AUTO: 30.5 %
NEUTROPHILS # BLD AUTO: 1.39 X10 (3) UL (ref 1.5–7.7)
NEUTROPHILS # BLD AUTO: 1.39 X10(3) UL (ref 1.5–7.7)
NEUTROPHILS # BLD AUTO: 1.9 X10 (3) UL (ref 1.5–7.7)
NEUTROPHILS # BLD AUTO: 1.9 X10(3) UL (ref 1.5–7.7)
NEUTROPHILS # BLD AUTO: 2.55 X10 (3) UL (ref 1.5–7.7)
NEUTROPHILS # BLD AUTO: 2.55 X10(3) UL (ref 1.5–7.7)
NEUTROPHILS # BLD AUTO: 2.64 X10 (3) UL (ref 1.5–7.7)
NEUTROPHILS # BLD AUTO: 2.64 X10(3) UL (ref 1.5–7.7)
NEUTROPHILS # BLD AUTO: 3.88 X10 (3) UL (ref 1.5–7.7)
NEUTROPHILS # BLD AUTO: 3.88 X10(3) UL (ref 1.5–7.7)
NEUTROPHILS # BLD AUTO: 4.1 X10 (3) UL (ref 1.5–7.7)
NEUTROPHILS # BLD AUTO: 4.1 X10(3) UL (ref 1.5–7.7)
NEUTROPHILS # BLD AUTO: 4.28 X10 (3) UL (ref 1.5–7.7)
NEUTROPHILS # BLD AUTO: 4.28 X10(3) UL (ref 1.5–7.7)
NEUTROPHILS # BLD AUTO: 4.44 X10 (3) UL (ref 1.5–7.7)
NEUTROPHILS # BLD AUTO: 4.44 X10(3) UL (ref 1.5–7.7)
NEUTROPHILS # BLD AUTO: 7.6 X10 (3) UL (ref 1.5–7.7)
NEUTROPHILS # BLD AUTO: 7.6 X10(3) UL (ref 1.5–7.7)
NEUTROPHILS NFR BLD AUTO: 42.8 %
NEUTROPHILS NFR BLD AUTO: 52.2 %
NEUTROPHILS NFR BLD AUTO: 58.8 %
NEUTROPHILS NFR BLD AUTO: 67.3 %
NEUTROPHILS NFR BLD AUTO: 68 %
NEUTROPHILS NFR BLD AUTO: 68.2 %
NEUTROPHILS NFR BLD AUTO: 69.4 %
NEUTROPHILS NFR BLD AUTO: 75.3 %
NEUTROPHILS NFR BLD AUTO: 77.5 %
OSMOLALITY SERPL CALC.SUM OF ELEC: 280 MOSM/KG (ref 275–295)
OSMOLALITY SERPL CALC.SUM OF ELEC: 285 MOSM/KG (ref 275–295)
OSMOLALITY SERPL CALC.SUM OF ELEC: 286 MOSM/KG (ref 275–295)
OSMOLALITY SERPL CALC.SUM OF ELEC: 290 MOSM/KG (ref 275–295)
OSMOLALITY SERPL CALC.SUM OF ELEC: 292 MOSM/KG (ref 275–295)
OSMOLALITY SERPL CALC.SUM OF ELEC: 293 MOSM/KG (ref 275–295)
OSMOLALITY SERPL CALC.SUM OF ELEC: 294 MOSM/KG (ref 275–295)
OSMOLALITY SERPL CALC.SUM OF ELEC: 295 MOSM/KG (ref 275–295)
OSMOLALITY SERPL CALC.SUM OF ELEC: 297 MOSM/KG (ref 275–295)
OSMOLALITY SERPL CALC.SUM OF ELEC: 300 MOSM/KG (ref 275–295)
OSMOLALITY SERPL CALC.SUM OF ELEC: 305 MOSM/KG (ref 275–295)
PLATELET # BLD AUTO: 143 10(3)UL (ref 150–450)
PLATELET # BLD AUTO: 169 10(3)UL (ref 150–450)
PLATELET # BLD AUTO: 174 10(3)UL (ref 150–450)
PLATELET # BLD AUTO: 202 10(3)UL (ref 150–450)
PLATELET # BLD AUTO: 207 10(3)UL (ref 150–450)
PLATELET # BLD AUTO: 210 10(3)UL (ref 150–450)
PLATELET # BLD AUTO: 211 10(3)UL (ref 150–450)
PLATELET # BLD AUTO: 214 10(3)UL (ref 150–450)
PLATELET # BLD AUTO: 219 10(3)UL (ref 150–450)
POTASSIUM SERPL-SCNC: 4.3 MMOL/L (ref 3.5–5.1)
POTASSIUM SERPL-SCNC: 4.4 MMOL/L (ref 3.5–5.1)
POTASSIUM SERPL-SCNC: 4.4 MMOL/L (ref 3.5–5.1)
POTASSIUM SERPL-SCNC: 4.5 MMOL/L (ref 3.5–5.1)
POTASSIUM SERPL-SCNC: 4.6 MMOL/L (ref 3.5–5.1)
POTASSIUM SERPL-SCNC: 4.6 MMOL/L (ref 3.5–5.1)
POTASSIUM SERPL-SCNC: 4.7 MMOL/L (ref 3.5–5.1)
POTASSIUM SERPL-SCNC: 4.7 MMOL/L (ref 3.5–5.1)
POTASSIUM SERPL-SCNC: 4.8 MMOL/L (ref 3.5–5.1)
POTASSIUM SERPL-SCNC: 4.9 MMOL/L (ref 3.5–5.1)
POTASSIUM SERPL-SCNC: 5.5 MMOL/L (ref 3.5–5.1)
RBC # BLD AUTO: 2.58 X10(6)UL (ref 3.8–5.8)
RBC # BLD AUTO: 2.81 X10(6)UL (ref 3.8–5.8)
RBC # BLD AUTO: 3.38 X10(6)UL (ref 3.8–5.8)
RBC # BLD AUTO: 3.44 X10(6)UL (ref 3.8–5.8)
RBC # BLD AUTO: 3.48 X10(6)UL (ref 3.8–5.8)
RBC # BLD AUTO: 3.6 X10(6)UL (ref 3.8–5.8)
RBC # BLD AUTO: 3.64 X10(6)UL (ref 3.8–5.8)
RBC # BLD AUTO: 3.7 X10(6)UL (ref 3.8–5.8)
RBC # BLD AUTO: 3.77 X10(6)UL (ref 3.8–5.8)
SODIUM SERPL-SCNC: 132 MMOL/L (ref 136–145)
SODIUM SERPL-SCNC: 135 MMOL/L (ref 136–145)
SODIUM SERPL-SCNC: 136 MMOL/L (ref 136–145)
SODIUM SERPL-SCNC: 136 MMOL/L (ref 136–145)
SODIUM SERPL-SCNC: 137 MMOL/L (ref 136–145)
SODIUM SERPL-SCNC: 139 MMOL/L (ref 136–145)
SODIUM SERPL-SCNC: 141 MMOL/L (ref 136–145)
SODIUM SERPL-SCNC: 141 MMOL/L (ref 136–145)
SODIUM SERPL-SCNC: 143 MMOL/L (ref 136–145)
WBC # BLD AUTO: 2.8 X10(3) UL (ref 4–11)
WBC # BLD AUTO: 3.3 X10(3) UL (ref 4–11)
WBC # BLD AUTO: 4.3 X10(3) UL (ref 4–11)
WBC # BLD AUTO: 5.1 X10(3) UL (ref 4–11)
WBC # BLD AUTO: 5.2 X10(3) UL (ref 4–11)
WBC # BLD AUTO: 6.1 X10(3) UL (ref 4–11)
WBC # BLD AUTO: 6.2 X10(3) UL (ref 4–11)
WBC # BLD AUTO: 6.5 X10(3) UL (ref 4–11)
WBC # BLD AUTO: 9.8 X10(3) UL (ref 4–11)

## 2019-01-01 PROCEDURE — 80053 COMPREHEN METABOLIC PANEL: CPT

## 2019-01-01 PROCEDURE — 36593 DECLOT VASCULAR DEVICE: CPT

## 2019-01-01 PROCEDURE — 85025 COMPLETE CBC W/AUTO DIFF WBC: CPT

## 2019-01-01 PROCEDURE — 80048 BASIC METABOLIC PNL TOTAL CA: CPT

## 2019-01-01 PROCEDURE — 99215 OFFICE O/P EST HI 40 MIN: CPT | Performed by: CLINICAL NURSE SPECIALIST

## 2019-01-01 PROCEDURE — 96523 IRRIG DRUG DELIVERY DEVICE: CPT

## 2019-01-01 PROCEDURE — 83615 LACTATE (LD) (LDH) ENZYME: CPT

## 2019-01-01 PROCEDURE — 96360 HYDRATION IV INFUSION INIT: CPT

## 2019-01-01 PROCEDURE — 99215 OFFICE O/P EST HI 40 MIN: CPT | Performed by: INTERNAL MEDICINE

## 2019-01-01 PROCEDURE — 96417 CHEMO IV INFUS EACH ADDL SEQ: CPT

## 2019-01-01 PROCEDURE — 99211 OFF/OP EST MAY X REQ PHY/QHP: CPT

## 2019-01-01 PROCEDURE — 96375 TX/PRO/DX INJ NEW DRUG ADDON: CPT

## 2019-01-01 PROCEDURE — S0028 INJECTION, FAMOTIDINE, 20 MG: HCPCS | Performed by: INTERNAL MEDICINE

## 2019-01-01 PROCEDURE — 83735 ASSAY OF MAGNESIUM: CPT

## 2019-01-01 PROCEDURE — 96413 CHEMO IV INFUSION 1 HR: CPT

## 2019-01-01 PROCEDURE — 96361 HYDRATE IV INFUSION ADD-ON: CPT

## 2019-01-01 PROCEDURE — 93880 EXTRACRANIAL BILAT STUDY: CPT | Performed by: NURSE PRACTITIONER

## 2019-01-01 PROCEDURE — 36592 COLLECT BLOOD FROM PICC: CPT

## 2019-01-01 PROCEDURE — B518ZZA FLUOROSCOPY OF SUPERIOR VENA CAVA, GUIDANCE: ICD-10-PCS | Performed by: RADIOLOGY

## 2019-01-01 PROCEDURE — 71260 CT THORAX DX C+: CPT | Performed by: CLINICAL NURSE SPECIALIST

## 2019-01-01 PROCEDURE — 82962 GLUCOSE BLOOD TEST: CPT

## 2019-01-01 PROCEDURE — S0028 INJECTION, FAMOTIDINE, 20 MG: HCPCS | Performed by: CLINICAL NURSE SPECIALIST

## 2019-01-01 PROCEDURE — 96374 THER/PROPH/DIAG INJ IV PUSH: CPT

## 2019-01-01 PROCEDURE — 76937 US GUIDE VASCULAR ACCESS: CPT

## 2019-01-01 PROCEDURE — 99214 OFFICE O/P EST MOD 30 MIN: CPT | Performed by: NURSE PRACTITIONER

## 2019-01-01 PROCEDURE — B544ZZA ULTRASONOGRAPHY OF LEFT JUGULAR VEINS, GUIDANCE: ICD-10-PCS | Performed by: RADIOLOGY

## 2019-01-01 PROCEDURE — 82378 CARCINOEMBRYONIC ANTIGEN: CPT

## 2019-01-01 PROCEDURE — 0JH60WZ INSERTION OF TOTALLY IMPLANTABLE VASCULAR ACCESS DEVICE INTO CHEST SUBCUTANEOUS TISSUE AND FASCIA, OPEN APPROACH: ICD-10-PCS | Performed by: RADIOLOGY

## 2019-01-01 PROCEDURE — 84443 ASSAY THYROID STIM HORMONE: CPT

## 2019-01-01 PROCEDURE — 78815 PET IMAGE W/CT SKULL-THIGH: CPT | Performed by: INTERNAL MEDICINE

## 2019-01-01 PROCEDURE — 96415 CHEMO IV INFUSION ADDL HR: CPT

## 2019-01-01 PROCEDURE — 99152 MOD SED SAME PHYS/QHP 5/>YRS: CPT

## 2019-01-01 PROCEDURE — 99153 MOD SED SAME PHYS/QHP EA: CPT

## 2019-01-01 PROCEDURE — 85610 PROTHROMBIN TIME: CPT

## 2019-01-01 PROCEDURE — 02HV33Z INSERTION OF INFUSION DEVICE INTO SUPERIOR VENA CAVA, PERCUTANEOUS APPROACH: ICD-10-PCS | Performed by: RADIOLOGY

## 2019-01-01 PROCEDURE — 36561 INSERT TUNNELED CV CATH: CPT

## 2019-01-01 PROCEDURE — 77001 FLUOROGUIDE FOR VEIN DEVICE: CPT

## 2019-01-01 PROCEDURE — 99215 OFFICE O/P EST HI 40 MIN: CPT | Performed by: NURSE PRACTITIONER

## 2019-01-01 PROCEDURE — B543ZZA ULTRASONOGRAPHY OF RIGHT JUGULAR VEINS, GUIDANCE: ICD-10-PCS | Performed by: RADIOLOGY

## 2019-01-01 RX ORDER — SODIUM CHLORIDE 0.9 % (FLUSH) 0.9 %
10 SYRINGE (ML) INJECTION ONCE
Status: CANCELLED | OUTPATIENT
Start: 2019-01-01

## 2019-01-01 RX ORDER — LORAZEPAM 2 MG/ML
INJECTION INTRAMUSCULAR EVERY 4 HOURS PRN
Status: CANCELLED | OUTPATIENT
Start: 2019-01-01

## 2019-01-01 RX ORDER — SODIUM CHLORIDE 0.9 % (FLUSH) 0.9 %
10 SYRINGE (ML) INJECTION ONCE
Status: COMPLETED | OUTPATIENT
Start: 2019-01-01 | End: 2019-01-01

## 2019-01-01 RX ORDER — LORAZEPAM 0.5 MG/1
TABLET ORAL EVERY 4 HOURS PRN
Status: CANCELLED | OUTPATIENT
Start: 2019-01-01

## 2019-01-01 RX ORDER — LEVOFLOXACIN 500 MG/1
500 TABLET, FILM COATED ORAL DAILY
Qty: 10 TABLET | Refills: 0 | Status: SHIPPED | OUTPATIENT
Start: 2019-01-01 | End: 2019-01-01

## 2019-01-01 RX ORDER — DIPHENHYDRAMINE HCL 25 MG
CAPSULE ORAL
Qty: 2 CAPSULE | Refills: 0 | Status: SHIPPED | OUTPATIENT
Start: 2019-01-01

## 2019-01-01 RX ORDER — PREDNISONE 50 MG/1
TABLET ORAL
Qty: 3 TABLET | Refills: 0 | Status: SHIPPED | OUTPATIENT
Start: 2019-01-01 | End: 2019-01-01

## 2019-01-01 RX ORDER — HYDROMORPHONE HYDROCHLORIDE 1 MG/ML
0.5 INJECTION, SOLUTION INTRAMUSCULAR; INTRAVENOUS; SUBCUTANEOUS ONCE
Status: CANCELLED
Start: 2019-01-01

## 2019-01-01 RX ORDER — METOCLOPRAMIDE HYDROCHLORIDE 5 MG/ML
10 INJECTION INTRAMUSCULAR; INTRAVENOUS EVERY 6 HOURS PRN
Status: CANCELLED | OUTPATIENT
Start: 2019-01-01

## 2019-01-01 RX ORDER — DIPHENHYDRAMINE HYDROCHLORIDE 50 MG/ML
50 INJECTION INTRAMUSCULAR; INTRAVENOUS ONCE
Status: CANCELLED | OUTPATIENT
Start: 2019-01-01

## 2019-01-01 RX ORDER — PREDNISONE 50 MG/1
TABLET ORAL
Qty: 3 TABLET | Refills: 0 | Status: SHIPPED | OUTPATIENT
Start: 2019-01-01

## 2019-01-01 RX ORDER — HEPARIN SODIUM 5000 [USP'U]/ML
INJECTION, SOLUTION INTRAVENOUS; SUBCUTANEOUS
Status: COMPLETED
Start: 2019-01-01 | End: 2019-01-01

## 2019-01-01 RX ORDER — HYDROMORPHONE HYDROCHLORIDE 1 MG/ML
0.5 INJECTION, SOLUTION INTRAMUSCULAR; INTRAVENOUS; SUBCUTANEOUS ONCE
Status: COMPLETED | OUTPATIENT
Start: 2019-01-01 | End: 2019-01-01

## 2019-01-01 RX ORDER — LIDOCAINE AND PRILOCAINE 25; 25 MG/G; MG/G
CREAM TOPICAL
Qty: 1 TUBE | Refills: 3 | Status: SHIPPED | OUTPATIENT
Start: 2019-01-01

## 2019-01-01 RX ORDER — FAMOTIDINE 10 MG/ML
20 INJECTION, SOLUTION INTRAVENOUS ONCE
Status: COMPLETED | OUTPATIENT
Start: 2019-01-01 | End: 2019-01-01

## 2019-01-01 RX ORDER — DIPHENHYDRAMINE HYDROCHLORIDE 50 MG/ML
50 INJECTION INTRAMUSCULAR; INTRAVENOUS ONCE
Status: COMPLETED | OUTPATIENT
Start: 2019-01-01 | End: 2019-01-01

## 2019-01-01 RX ORDER — DIPHENHYDRAMINE HCL 25 MG
CAPSULE ORAL
Qty: 2 CAPSULE | Refills: 0 | Status: SHIPPED | OUTPATIENT
Start: 2019-01-01 | End: 2019-01-01

## 2019-01-01 RX ORDER — PROCHLORPERAZINE MALEATE 10 MG
10 TABLET ORAL EVERY 6 HOURS PRN
Status: CANCELLED | OUTPATIENT
Start: 2019-01-01

## 2019-01-01 RX ORDER — SODIUM CHLORIDE 9 MG/ML
INJECTION, SOLUTION INTRAVENOUS ONCE
Status: COMPLETED | OUTPATIENT
Start: 2019-01-01 | End: 2019-01-01

## 2019-01-01 RX ORDER — ROSUVASTATIN CALCIUM 40 MG/1
TABLET, COATED ORAL
Qty: 15 TABLET | Refills: 5 | Status: SHIPPED | OUTPATIENT
Start: 2019-01-01

## 2019-01-01 RX ORDER — SODIUM CHLORIDE 9 MG/ML
INJECTION, SOLUTION INTRAVENOUS CONTINUOUS
Status: DISCONTINUED | OUTPATIENT
Start: 2019-01-01 | End: 2019-01-01

## 2019-01-01 RX ORDER — HYDROMORPHONE HYDROCHLORIDE 1 MG/ML
1 INJECTION, SOLUTION INTRAMUSCULAR; INTRAVENOUS; SUBCUTANEOUS ONCE
Status: CANCELLED
Start: 2019-01-01

## 2019-01-01 RX ORDER — ENALAPRIL MALEATE 20 MG/1
40 TABLET ORAL DAILY
Qty: 180 TABLET | Refills: 0 | Status: SHIPPED | OUTPATIENT
Start: 2019-01-01 | End: 2019-01-01

## 2019-01-01 RX ORDER — VITAMIN B COMPLEX
1 TABLET ORAL DAILY
COMMUNITY

## 2019-01-01 RX ORDER — ONDANSETRON 2 MG/ML
8 INJECTION INTRAMUSCULAR; INTRAVENOUS EVERY 6 HOURS PRN
Status: CANCELLED | OUTPATIENT
Start: 2019-01-01

## 2019-01-01 RX ORDER — SODIUM CHLORIDE 9 MG/ML
INJECTION, SOLUTION INTRAVENOUS ONCE
Status: CANCELLED
Start: 2019-01-01

## 2019-01-01 RX ORDER — FAMOTIDINE 10 MG/ML
20 INJECTION, SOLUTION INTRAVENOUS ONCE
Status: CANCELLED | OUTPATIENT
Start: 2019-01-01

## 2019-01-01 RX ORDER — SODIUM CHLORIDE 0.9 % (FLUSH) 0.9 %
10 SYRINGE (ML) INJECTION ONCE
Status: DISCONTINUED | OUTPATIENT
Start: 2019-01-01 | End: 2019-01-01

## 2019-01-01 RX ORDER — DEXAMETHASONE SODIUM PHOSPHATE 4 MG/ML
8 VIAL (ML) INJECTION ONCE
Status: DISCONTINUED | OUTPATIENT
Start: 2019-01-01 | End: 2019-01-01

## 2019-01-01 RX ORDER — PREDNISONE 10 MG/1
TABLET ORAL
Qty: 66 TABLET | Refills: 0 | Status: SHIPPED | OUTPATIENT
Start: 2019-01-01 | End: 2019-01-01

## 2019-01-01 RX ORDER — LIDOCAINE HYDROCHLORIDE 10 MG/ML
INJECTION, SOLUTION INFILTRATION; PERINEURAL
Status: COMPLETED
Start: 2019-01-01 | End: 2019-01-01

## 2019-01-01 RX ORDER — CLOPIDOGREL BISULFATE 75 MG/1
75 TABLET ORAL DAILY
Qty: 30 TABLET | Refills: 11 | Status: SHIPPED | OUTPATIENT
Start: 2019-01-01 | End: 2019-01-01

## 2019-01-01 RX ORDER — MIDAZOLAM HYDROCHLORIDE 1 MG/ML
INJECTION INTRAMUSCULAR; INTRAVENOUS
Status: COMPLETED
Start: 2019-01-01 | End: 2019-01-01

## 2019-01-01 RX ORDER — BENZONATATE 100 MG/1
CAPSULE ORAL 3 TIMES DAILY PRN
Qty: 60 CAPSULE | Refills: 0 | Status: SHIPPED | OUTPATIENT
Start: 2019-01-01

## 2019-01-01 RX ORDER — HYDROMORPHONE HYDROCHLORIDE 1 MG/ML
1 INJECTION, SOLUTION INTRAMUSCULAR; INTRAVENOUS; SUBCUTANEOUS ONCE
Status: COMPLETED | OUTPATIENT
Start: 2019-01-01 | End: 2019-01-01

## 2019-01-01 RX ORDER — MELATONIN
5000 DAILY
COMMUNITY

## 2019-01-01 RX ORDER — CLINDAMYCIN PHOSPHATE 150 MG/ML
INJECTION, SOLUTION INTRAVENOUS
Status: COMPLETED
Start: 2019-01-01 | End: 2019-01-01

## 2019-01-01 RX ORDER — CEFAZOLIN SODIUM/WATER 2 G/20 ML
SYRINGE (ML) INTRAVENOUS
Status: COMPLETED
Start: 2019-01-01 | End: 2019-01-01

## 2019-01-01 RX ORDER — PREDNISONE 50 MG/1
TABLET ORAL
Qty: 3 TABLET | Refills: 0 | Status: SHIPPED | OUTPATIENT
Start: 2019-01-01 | End: 2019-01-01 | Stop reason: ALTCHOICE

## 2019-01-01 RX ORDER — TRAMADOL HYDROCHLORIDE 50 MG/1
TABLET ORAL EVERY 6 HOURS PRN
Qty: 40 TABLET | Refills: 0 | Status: SHIPPED | OUTPATIENT
Start: 2019-01-01 | End: 2019-01-01

## 2019-01-01 RX ORDER — SODIUM CHLORIDE 9 MG/ML
INJECTION, SOLUTION INTRAVENOUS CONTINUOUS
Status: CANCELLED
Start: 2019-01-01

## 2019-01-01 RX ORDER — MORPHINE SULFATE 15 MG/1
15 TABLET, FILM COATED, EXTENDED RELEASE ORAL EVERY 12 HOURS SCHEDULED
Qty: 30 TABLET | Refills: 0 | Status: SHIPPED | OUTPATIENT
Start: 2019-01-01 | End: 2019-08-16

## 2019-01-01 RX ORDER — ENALAPRIL MALEATE 20 MG/1
40 TABLET ORAL DAILY
Qty: 60 TABLET | Refills: 0 | Status: SHIPPED | OUTPATIENT
Start: 2019-01-01 | End: 2019-01-01

## 2019-01-01 RX ORDER — TAMSULOSIN HYDROCHLORIDE 0.4 MG/1
CAPSULE ORAL
Qty: 30 CAPSULE | Refills: 5 | Status: SHIPPED | OUTPATIENT
Start: 2019-01-01

## 2019-01-01 RX ORDER — HYDROMORPHONE HYDROCHLORIDE 2 MG/1
2 TABLET ORAL EVERY 4 HOURS PRN
Qty: 60 TABLET | Refills: 0 | Status: SHIPPED | OUTPATIENT
Start: 2019-01-01

## 2019-01-01 RX ORDER — BACITRACIN 50000 [USP'U]/1
INJECTION, POWDER, LYOPHILIZED, FOR SOLUTION INTRAMUSCULAR
Status: COMPLETED
Start: 2019-01-01 | End: 2019-01-01

## 2019-01-01 RX ADMIN — SODIUM CHLORIDE 0.9 % (FLUSH) 10 ML: 0.9 % SYRINGE (ML) INJECTION at 13:45:00

## 2019-01-01 RX ADMIN — SODIUM CHLORIDE 0.9 % (FLUSH) 10 ML: 0.9 % SYRINGE (ML) INJECTION at 16:30:00

## 2019-01-01 RX ADMIN — SODIUM CHLORIDE 0.9 % (FLUSH) 10 ML: 0.9 % SYRINGE (ML) INJECTION at 13:20:00

## 2019-01-01 RX ADMIN — SODIUM CHLORIDE: 9 INJECTION, SOLUTION INTRAVENOUS at 13:45:00

## 2019-01-01 RX ADMIN — SODIUM CHLORIDE 0.9 % (FLUSH) 10 ML: 0.9 % SYRINGE (ML) INJECTION at 11:48:00

## 2019-01-01 RX ADMIN — SODIUM CHLORIDE 0.9 % (FLUSH) 10 ML: 0.9 % SYRINGE (ML) INJECTION at 10:10:00

## 2019-01-01 RX ADMIN — SODIUM CHLORIDE: 9 INJECTION, SOLUTION INTRAVENOUS at 14:58:00

## 2019-01-01 RX ADMIN — SODIUM CHLORIDE 0.9 % (FLUSH) 10 ML: 0.9 % SYRINGE (ML) INJECTION at 13:00:00

## 2019-01-01 RX ADMIN — FAMOTIDINE 20 MG: 10 INJECTION, SOLUTION INTRAVENOUS at 10:00:00

## 2019-01-01 RX ADMIN — HYDROMORPHONE HYDROCHLORIDE 1 MG: 1 INJECTION, SOLUTION INTRAMUSCULAR; INTRAVENOUS; SUBCUTANEOUS at 09:26:00

## 2019-01-01 RX ADMIN — SODIUM CHLORIDE 0.9 % (FLUSH) 10 ML: 0.9 % SYRINGE (ML) INJECTION at 12:17:00

## 2019-01-01 RX ADMIN — SODIUM CHLORIDE: 9 INJECTION, SOLUTION INTRAVENOUS at 13:57:00

## 2019-01-01 RX ADMIN — SODIUM CHLORIDE 0.9 % (FLUSH) 10 ML: 0.9 % SYRINGE (ML) INJECTION at 08:00:00

## 2019-01-01 RX ADMIN — DIPHENHYDRAMINE HYDROCHLORIDE 50 MG: 50 INJECTION INTRAMUSCULAR; INTRAVENOUS at 09:13:00

## 2019-01-01 RX ADMIN — SODIUM CHLORIDE 0.9 % (FLUSH) 10 ML: 0.9 % SYRINGE (ML) INJECTION at 13:30:00

## 2019-01-01 RX ADMIN — SODIUM CHLORIDE 0.9 % (FLUSH) 10 ML: 0.9 % SYRINGE (ML) INJECTION at 13:04:00

## 2019-01-01 RX ADMIN — SODIUM CHLORIDE 0.9 % (FLUSH) 10 ML: 0.9 % SYRINGE (ML) INJECTION at 15:01:00

## 2019-01-01 RX ADMIN — DIPHENHYDRAMINE HYDROCHLORIDE 50 MG: 50 INJECTION INTRAMUSCULAR; INTRAVENOUS at 09:59:00

## 2019-01-01 RX ADMIN — SODIUM CHLORIDE 0.9 % (FLUSH) 10 ML: 0.9 % SYRINGE (ML) INJECTION at 13:10:00

## 2019-01-01 RX ADMIN — FAMOTIDINE 20 MG: 10 INJECTION, SOLUTION INTRAVENOUS at 10:11:00

## 2019-01-01 RX ADMIN — SODIUM CHLORIDE: 9 INJECTION, SOLUTION INTRAVENOUS at 09:36:00

## 2019-01-01 RX ADMIN — SODIUM CHLORIDE 0.9 % (FLUSH) 10 ML: 0.9 % SYRINGE (ML) INJECTION at 15:40:00

## 2019-01-01 RX ADMIN — SODIUM CHLORIDE 0.9 % (FLUSH) 10 ML: 0.9 % SYRINGE (ML) INJECTION at 14:10:00

## 2019-01-01 RX ADMIN — SODIUM CHLORIDE 0.9 % (FLUSH) 10 ML: 0.9 % SYRINGE (ML) INJECTION at 15:12:00

## 2019-01-01 RX ADMIN — SODIUM CHLORIDE 0.9 % (FLUSH) 10 ML: 0.9 % SYRINGE (ML) INJECTION at 16:15:00

## 2019-01-01 RX ADMIN — FAMOTIDINE 20 MG: 10 INJECTION, SOLUTION INTRAVENOUS at 09:10:00

## 2019-01-01 RX ADMIN — SODIUM CHLORIDE 0.9 % (FLUSH) 10 ML: 0.9 % SYRINGE (ML) INJECTION at 10:20:00

## 2019-01-01 RX ADMIN — SODIUM CHLORIDE: 9 INJECTION, SOLUTION INTRAVENOUS at 14:29:00

## 2019-01-01 RX ADMIN — DIPHENHYDRAMINE HYDROCHLORIDE 50 MG: 50 INJECTION INTRAMUSCULAR; INTRAVENOUS at 10:14:00

## 2019-01-01 RX ADMIN — SODIUM CHLORIDE 0.9 % (FLUSH) 10 ML: 0.9 % SYRINGE (ML) INJECTION at 12:55:00

## 2019-01-01 RX ADMIN — SODIUM CHLORIDE 0.9 % (FLUSH) 10 ML: 0.9 % SYRINGE (ML) INJECTION at 12:58:00

## 2019-01-01 RX ADMIN — SODIUM CHLORIDE 0.9 % (FLUSH) 10 ML: 0.9 % SYRINGE (ML) INJECTION at 12:45:00

## 2019-01-01 RX ADMIN — SODIUM CHLORIDE: 9 INJECTION, SOLUTION INTRAVENOUS at 13:06:00

## 2019-01-01 RX ADMIN — FAMOTIDINE 20 MG: 10 INJECTION, SOLUTION INTRAVENOUS at 09:03:00

## 2019-01-01 RX ADMIN — DIPHENHYDRAMINE HYDROCHLORIDE 50 MG: 50 INJECTION INTRAMUSCULAR; INTRAVENOUS at 08:59:00

## 2019-01-01 RX ADMIN — HYDROMORPHONE HYDROCHLORIDE 0.5 MG: 1 INJECTION, SOLUTION INTRAMUSCULAR; INTRAVENOUS; SUBCUTANEOUS at 08:50:00

## 2019-01-14 NOTE — TELEPHONE ENCOUNTER
Medication refilled per protocol. Requested Prescriptions     Signed Prescriptions Disp Refills   • ENALAPRIL MALEATE 20 MG Oral Tab 60 tablet 0     Sig: TAKE 2 TABLETS (40 MG TOTAL) BY MOUTH DAILY.      Authorizing Provider: Ruby Miner

## 2019-01-22 NOTE — PROGRESS NOTES
Education Record    Learner:  Patient    Disease / Diagnosis:PICC dressing change    Barriers / Limitations:  None   Comments:    Method:  Discussion   Comments:    General Topics:  Procedure and Plan of care reviewed   Comments:PICC dressing change and fl

## 2019-01-23 NOTE — TELEPHONE ENCOUNTER
Called him with review of outside CT- went to voicemail. Left a message. PPost RT and benign changes. Proceed with PET as planned.

## 2019-02-09 NOTE — TELEPHONE ENCOUNTER
Hypertension Medications Protocol Passed2/8 5:49 PM  + CMP or BMP in past 12 months   + Last serum creatinine< 2.0   + Appointment in past 6 or next 3 months     Refilled Enalapril per protocol

## 2019-02-11 NOTE — TELEPHONE ENCOUNTER
Patient is calling why is the Enalapril Maleate 20 mg is only 30 days at a time? Is it because of his cancer.

## 2019-02-12 NOTE — TELEPHONE ENCOUNTER
Talked to joselin and he was wondering why he was not getting a years worth then he remembered that he had this increased before the cancer treatment and when done might decrease to a lower dose so OK with the current amount.  Also wanted to know if Dr Sarkar Sedgwick

## 2019-02-15 NOTE — TELEPHONE ENCOUNTER
Called him with PET results which unfortunately showed progression of liver metastases. Scheduled to see me Friday.

## 2019-02-15 NOTE — PROGRESS NOTES
Cancer Center Progress Note    Patient Name: Janee Brar   YOB: 1945   Medical Record Number: [id number]   CSN: [id number]   Attending Physician: Julisa Guajardo M.D.    Referring Physician: Kristy Yang MD      Date of Visit: 2/15/201 consensus was to treat chest disease with chemoRT followed by SBRT to oligometastatic liver lesion.      - chemoRT with cisplatin and etoposide 7/9-8/13/18. 2. LUE superficial thrombophlebitis from previous IV access.  First treated with NSAIDs, elevatio Oral Tab, Take 1,000 mg by mouth daily. , Disp: , Rfl:   •  Omega-3 Fatty Acids (FISH OIL OR), Take 1,000 mg by mouth daily.   , Disp: , Rfl:   •  L-ARGININE OR, Take 1,000 mg by mouth.  , Disp: , Rfl:   •  DiphenhydrAMINE HCl (BENADRYL ALLERGY) 25 MG Oral ENDARTERECTOMY Right 3/11/2016    Performed by Valente Skinner MD at [address] Left 5/10/2013    Performed by Valente Skinner MD at . Pck 125 (BMS)      left *carotid* stent 02/16   • COLONOSCOPY  2006   • C session: Not on file      Stress: Not on file    Relationships      Social connections:        Talks on phone: Not on file        Gets together: Not on file        Attends Worship service: Not on file        Active member of club or organization: Not on oz)   SpO2 92%   BMI 27.32 kg/m²       Physical Examination:  General: Patient is alert and oriented x 3, not in acute distress. Psych:  Mood and affect appropriate  HEENT: EOMs intact. PERRL. Oropharynx clear  Neck: No JVD. No palpable lymphadenopathy. main bronchus     TECHNIQUE:  The patient fasted for at least 6 hours. F-18 FDG was injected IV, and whole-body images from vertex to mid-thigh were obtained with concurrent CT scan for both anatomic localization as well as attenuation correction.       RAD lymphadenopathy is identified. Abdomen and pelvis: There are numerous new foci of abnormal increased FDG activity identified throughout the liver in both the right and left hepatic lobes and within the uncinate process.   A few representative examples Dictated by: Renetta Finnegan MD on 2019 at 13:23       Approved by: Renetta Finnegan MD           Impression and Plan:  I had called Mr. Claudene Newborn with his PET scan results as soon as these were available. We reviewed this again today.  This unfo

## 2019-02-19 NOTE — TELEPHONE ENCOUNTER
Patient currently scheduled tomorrow for C1D1 of Avastan, Taxol, Carboplatin, and Tecentriq. Per Moi Verduzco, from billing department, chemotherapy treatment is not approved yet through insurance.  [place], notified and order to cancel tomorrows appoin

## 2019-02-19 NOTE — PROGRESS NOTES
IV Chemotherapy Education    Patient: Vicki Ray   Date: 2/19/19   Diagnosis:  Metastatic Lung      Drug names: Carboplatin, Tecentriq, Paclitaxel     Treatment Effects on Bone Marrow:  Chemotherapy action on cancer / normal cells}  Function of white blo

## 2019-02-19 NOTE — PROGRESS NOTES
STUDY: Biodesix  ID # [id number]  Consent Note    Met with patient today following provider visit. Introduced myself and explained the role of research nurse.  APN had previously discussed the Biodesix testing with patient, but allowed for questions and reviewe

## 2019-02-28 NOTE — TELEPHONE ENCOUNTER
Date of Treatment: 2/27/19                                 Chemo: Atezolizumab/Taxol/Carbo/Avastin    Comments: Spoke with patient. He said that his \"liver pain\" has improved. Bad taste in mouth and choosing different foods to eat.   He has picc in place

## 2019-03-04 NOTE — PROGRESS NOTES
Nutrition Consultation    Patient Name: Maria Horn  YOB: 1945  Medical Record Number: [id number]   Account Number: [de-identified]  Dietitian: Suzie Mckenzie RD    Date of visit: 2/27/2019    Diet Rx: high protein/quality, macronutrient balance scheduled scan. 2nd dose 7 hours prior to scan. 3rd dose 1 hour prior to scan., Disp: 3 tablet, Rfl: 0  •  ENALAPRIL MALEATE 20 MG Oral Tab, TAKE 2 TABLETS (40 MG TOTAL) BY MOUTH DAILY. , Disp: 180 tablet, Rfl: 0  •  DiphenhydrAMINE HCl (BENADRYL ALLERGY) 2 , Disp: , Rfl:   •  Omega-3 Fatty Acids (FISH OIL OR), Take 1,000 mg by mouth daily.   , Disp: , Rfl:   •  L-ARGININE OR, Take 1,000 mg by mouth.  , Disp: , Rfl:     Pertinent Labs: noted    Height: 5'7\"          IBW: 148  +/- 10%    WT HX:   02/27/19: 171

## 2019-03-05 NOTE — PATIENT INSTRUCTIONS
Instructions from [facility], [place]    Drink plenty of liquids at home. Return tomorrow for another check up. DO NOT TAKE NAPROXEN OR IBUPROFEN due to the change in your kidney function. DO NOT TAKE Enalapril due to low blood pressure.   Your potassium

## 2019-03-05 NOTE — PROGRESS NOTES
Cancer Center Progress Note    Patient Name: Mary Hines   YOB: 1945   Medical Record Number: [id number]   CSN: [id number]   Date of visit: 3/5/2019   Provider: JENELLE Lozada  Referring Physician: No ref.  provider found    Proble endobronchial lesion showed a moderate to poorly differentiated NSCLC with squamoid features.  FNA of 11L was negative for malignancy but 7 subcarinal node was positive for metastatic carcinoma.                    - staging w/u done with PET scan and MRI br anything else. pain now but less noteable-.       Allergies:    Pcns [Penicillins]      UNKNOWN    Comment:childhood  Radiology Contrast *    ANAPHYLAXIS    Comment:States extreme HTN after iodine contrast. Pt             states he almost diedOther reactio mouth twice daily for 7 days, then take 1 tab (5mg) by mouth twice daily thereafter., Disp: 74 tablet, Rfl: 0  •  Naproxen Sodium 550 MG Oral Tab, Take 1 tablet (550 mg total) by mouth 2 (two) times daily with meals. , Disp: 30 tablet, Rfl: 0  •  Nutritiona Hematocrit Latest Ref Range: 39.0 - 53.0 % 35.5 (L)   Platelet Count Latest Ref Range: 150.0 - 450.0 10(3)uL 202.0   RBC Latest Ref Range: 3.80 - 5.80 x10(6)uL 3.77 (L)   MCH Latest Ref Range: 26.0 - 34.0 pg 32.9   MCHC Latest Ref Range: 31.0 - 37.0 g/dL Range: 1.6 - 2.6 mg/dL 1.8       Impression/Plan:  Metastatic lung cancer:  New liver mets therefore now receiving Carbo/paclitaxel/Tecentriq. Received cycle 1 1 wk ago.     REGINALD, Dehydration, hypotension, Hyperkalemia:  1 liter NS  No Enalapril  No NSAIDS

## 2019-03-05 NOTE — PROGRESS NOTES
Education Record    Learner:  Patient    Disease / Diagnosis: Lung Ca;  Dehydration    Barriers / Limitations:  None   Comments:    Method:  Brief focused and Reinforcement   Comments:    General Topics:  Plan of care reviewed and Fall risk and prevention

## 2019-03-05 NOTE — PROGRESS NOTES
Patient presents with: Follow - Up: APN Assessment Day 8    Pt is here for follow up; Day 8 tecentriq, carboplatin, and paclitaxel. Pt reports increased fatigue, nausea, dry heaves, diarrhea, weakness and decreased appetite.  Pt slept for the better part o

## 2019-03-05 NOTE — PATIENT INSTRUCTIONS
Drink plenty of liquids at home. Return tomorrow for another check up. DO NOT TAKE NAPROXEN OR IBUPROFEN due to the change in your kidney function. DO NOT TAKE Enalapril due to low blood pressure. Your potassium level is elevated.     Changes in your

## 2019-03-06 NOTE — PROGRESS NOTES
Cancer Center Progress Note    Patient Name: Johana Rucker   YOB: 1945   Medical Record Number: [id number]   CSN: [id number]   Date of visit: 3/6/2019   Provider: JENELLE Yancey  Referring Physician: No ref.  provider found    Proble endobronchial lesion showed a moderate to poorly differentiated NSCLC with squamoid features.  FNA of 11L was negative for malignancy but 7 subcarinal node was positive for metastatic carcinoma.                    - staging w/u done with PET scan and MRI br various areas of his body. Has h/o PVD. On clopidogrel. Complains of belching and reflux when he drinks more fluids. Takes TUMS PRN. Still unclear as to whether he is having nausea, nonetheless, not taking antiemetics.   He's not sure which meds he has (PROVENTIL HFA IN), Inhale into the lungs 2 (two) times daily. , Disp: , Rfl:   •  Tiotropium Bromide Monohydrate 18 MCG Inhalation Cap, Inhale into the lungs daily. , Disp: , Rfl:   •  Cholecalciferol (VITAMIN D) 2000 units Oral Cap, Take 2,000 Units by chuy BASIC METABOLIC PANEL (8)    Collection Time: 03/06/19 12:55 PM   Result Value Ref Range    Glucose 156 (H) 70 - 99 mg/dL    Sodium 136 136 - 145 mmol/L    Potassium 4.9 3.5 - 5.1 mmol/L    Chloride 107 98 - 107 mmol/L    CO2 21.0 21.0 - 32.0 mmol/L    A this time. Risk level:  High-metastatic lung cancer on chemotherapy, requiring intervention and close monitoring.     RTC 2 days for lab, and possible IVF    Tula Krabbe, [address] [place] [address] [facility]

## 2019-03-06 NOTE — PROGRESS NOTES
Pt here for labs and IVF. He is feeling a bit better, not urinating as much. HR still elevated. Mee Lim saw him and plan is for him to RTC on Friday, have labs drawn and then see if he needs IVF.

## 2019-03-08 NOTE — PROGRESS NOTES
Education Record    Learner:  Patient    Disease / Diagnosis: Lung Ca;  Dehydration    Barriers / Limitations:  None   Comments:    Method:  Brief focused and Reinforcement   Comments:    General Topics:  Plan of care reviewed   Comments:    Outcome:  Shows Pt presents to R#7 Aox4, in NAD, sent in by PMD to r/o TB after + quantiferon blood test. Pt denies any fevers/ chills/ cough/ CP/ SOB/ other acute medical complaints at this time. Recently dx with B/L breast CA, has not yet started treatment. VSS. Awaiting MD kapadia. Family at bedside.

## 2019-03-12 NOTE — PROGRESS NOTES
Blood pressure and lab results reviewed with marta zhang and orders received to have patient hold enalapril at this time and hydrate with 500cc 0.9 normal saline over one hour today, and patient to return to clinic on 3-15-19 for repeat labs and reass

## 2019-03-12 NOTE — TELEPHONE ENCOUNTER
Patient is calling stating he's over at the [facility] and was advised to stop Enalapril due to low blow pressure until Friday 3/22 ( until patient sees Dr. Joaquin Overall) Any questions contact patient

## 2019-03-18 NOTE — TELEPHONE ENCOUNTER
Spoke to pt who's requesting to speak to a nurse regarding his ENALAPRIL MALEATE. Pt indicates this medication is for 40 mg and pt is experiencing problems with his BP.

## 2019-03-18 NOTE — TELEPHONE ENCOUNTER
The last 2 weeks has had problems with Chemo so they took him off the enalapril 40 mg he has been running a low BP he is wondering if Dr Darvin Ramirez wants him to hold this until he is done with Chemo he has been running 120-130 whe he last took the enalapril hi

## 2019-03-18 NOTE — TELEPHONE ENCOUNTER
Called patient and told him of Dr Justin Montesinos decision he will do this and let us know if his pressure appears to be on the rise

## 2019-03-19 NOTE — PROGRESS NOTES
Education Record    Learner:  Patient    Disease / Diagnosis:met lung ca    Barriers / Limitations:  None   Comments:    Method:  Discussion   Comments:    General Topics:  Procedure and Plan of care reviewed   Comments:PICC dressing change, IVF for increa

## 2019-03-20 NOTE — PROGRESS NOTES
Education Record    Learner:  Patient    Disease / Diagnosis:  Lung cancer    Barriers / Limitations:  None   Comments:    Method:  Brief focused   Comments:    General Topics:  Medication, side effects, procedure   Comments:    Outcome:  Shows understandi

## 2019-03-20 NOTE — PROGRESS NOTES
Cancer Center Progress Note    Patient Name: Torsten Guerrier   YOB: 1945   Medical Record Number: [id number]   CSN: [id number]   Attending Physician: Bean Choi M.D.    Referring Physician: Makeda Cruz MD      Date of Visit: 3/20/201 - chemoRT with cisplatin and etoposide 7/9-8/13/18.     -  PET scan done 9/25/18 showed response with resolution of uptake in the RLL and subcarinal node with stable metastatic lesion within the right lobe of liver     - Received stereotactic ablative MOUTH AT BEDTIME, Disp: 15 tablet, Rfl: 5  •  Clopidogrel Bisulfate 75 MG Oral Tab, TAKE 1 TABLET  75 MG TOTAL  BY MOUTH DAILY, Disp: , Rfl: 99  •  TAMSULOSIN HCL 0.4 MG Oral Cap, TAKE 1 CAPSULE BY MOUTH DAILY, Disp: 30 capsule, Rfl: 5  •  apixaban 5 MG Or stent placement 2/12/2018   • History of common carotid artery stent placement 2/12/2018   • Hyperlipidemia 6/29/2012   • Osteoarthrosis, unspecified whether generalized or localized, unspecified site    • [facility]    • Personal hi Not on file      Transportation needs:        Medical: Not on file        Non-medical: Not on file    Tobacco Use      Smoking status: Former Smoker        Packs/day: 2.00        Years: 43.00        Pack years: 80        Types: Cigarettes        Quit date: Comment:States extreme HTN after iodine contrast. Pt             states he almost diedOther reaction(s): Other (See             Comments)             Has had HTN after             administration of IV contrast material circa 2005.              Premedication 8.2 (L) 03/19/2019    OSMOCALC 292 03/19/2019    ALKPHO 180 (H) 03/19/2019    AST 35 03/19/2019    ALT 58 03/19/2019    BILT 0.4 03/19/2019    TP 7.2 03/19/2019    ALB 3.2 (L) 03/19/2019    GLOBULT 2.7 09/17/2013    GLOBULIN 4.0 03/19/2019    ALBGLOBRAT 1. acute or subacute infarct. 3. Mild cerebral atrophy. No hydrocephalus, extra-axial collections, or midline shift is noted. PROCEDURE:  PET/CT STANDARD BODY SCAN (ONCOLOGY) (ZDG=[id number])     COMPARISON:  GEORGI CT CHEST (EAU=[id number]), 6/13/2018, 12:59. airspace disease measuring maximum SUV of   3.70. Infectious and/or neoplastic process are both included in the differential.  There is no axillary lymphadenopathy.   There is a tiny subpleural 3 mm lung nodule in the right upper lobe without abnormal FDG airspace opacification in the right lower lobe extending peripherally in the posterior medial aspect of the right lower lobe to the central right inferior hilum. There is mild increase FDG activity seen throughout this airspace opacity.     Differential in

## 2019-03-26 NOTE — PROGRESS NOTES
Patient arrived today very fatigued, short of breath, weak, unsteady. States he's been feeling this way since chemo last week. Patient denies nausea, appetite ok; states he drank 1L of fluids today. Labs drawn, patient seen by Margarito Rivera NP.  Orthostatic

## 2019-04-02 NOTE — PROGRESS NOTES
Education Record    Learner:  Patient    Disease / Diagnosis: Lung Cancer/picc dressing change    Barriers / Limitations:  None   Comments:    Method:  Brief focused   Comments:    General Topics:  Plan of care reviewed   Comments:    Outcome:  Shows under

## 2019-04-09 NOTE — PROGRESS NOTES
Education Record    Learner:  Patient    Disease / Diagnosis:    Barriers / Limitations:  None   Comments:    Method:  Brief focused and Discussion   Comments:    General Topics:  Plan of care reviewed and Fall risk and prevention   Comments:    Outcome:

## 2019-04-10 NOTE — PROGRESS NOTES
Cancer Center Progress Note    Patient Name: Padmini Wong   YOB: 1945   Medical Record Number: [id number]   CSN: [id number]   Attending Physician: Marti Guadarrama M.D.    Referring Physician: Binu Lantigua MD      Date of Visit: 4/10/201 to oligometastatic liver lesion.      - chemoRT with cisplatin and etoposide 7/9-8/13/18.     -  PET scan done 9/25/18 showed response with resolution of uptake in the RLL and subcarinal node with stable metastatic lesion within the right lobe of liver Rfl: 99  •  TAMSULOSIN HCL 0.4 MG Oral Cap, TAKE 1 CAPSULE BY MOUTH DAILY, Disp: 30 capsule, Rfl: 5  •  Naproxen Sodium 550 MG Oral Tab, Take 1 tablet (550 mg total) by mouth 2 (two) times daily with meals. , Disp: 30 tablet, Rfl: 0  •  Nutritional Suppleme Shortness of breath    • STROKE     TIA 1982 - no residual   • Unspecified sleep apnea 6/29/2012   • Visual impairment     glasses       Past Surgical History:  Past Surgical History:   Procedure Laterality Date   • CAROTID ENDARTERECTOMY Left    • CAROTID use: Yes        Alcohol/week: 0.0 - 0.6 oz        Comment: 1 drink a month      Drug use: No        Comment: did speed, smoked pot in the 70's. ... none since then.       Sexual activity: Not on file    Lifestyle      Physical activity:        Days per week: Systems:  A 14-point ROS was done with pertinent positives and negative per the HPI    Vital Signs:  /60 (BP Location: Left arm, Patient Position: Sitting, Cuff Size: adult)   Pulse 101   Temp 98.2 °F (36.8 °C) (Tympanic)   Resp 18   Ht 1.702 m (5' 7 of unspecified part of unspecified bronchus or lung    HISTORY: Left lung cancer    COMPARISON: Chest CT 1/16/2019, 2/23/2018    TECHNIQUE: Axial imaging through the chest, abdomen were obtained. Sagittal and coronal reformatted images were also evaluated. posterior segment right hepatic lobe (image 46) measuring 11 mm. Also, possible new low-density lesion in the posterior segment right hepatic lobe (image 36) measuring approximately 15 mm.  Also, ill-defined low density lesions appear to be near the hepatic the ACR Incidental Findings Committee. JACR, 14(7), [id number]. Clinical correlation advised.  See above      [employer] Users:     Please note all Radiology and Diagnostic Imaging results are subject to interpretation according to your specific medical histor on the current study. Previous 3 mm left lower lobe pulmonary nodule (image 67) is not definitely identified on the current study. Previous new 5 mm left lower lobe pulmonary nodule is smaller now measuring approximately 3 mm (image 65).     No definite bon pseudocyst if the patient has a documented prior history of pancreatitis; clinical correlation is needed in this regard.  In the absence of such a history, the differential diagnosis centers on small cystic pancreatic neoplasm, the majority of which are eit enhancement are noted. No significant inflammatory changes in mastoid air cells or visualized paranasal sinuses. Impression:     1. No enhancing mass or areas of pathologic enhancement are noted to suggest metastatic disease.     2. Mild chronic small posterior medial aspect of the right lower lobe to the inferior right   hilum. There are air bronchograms identified. Posterior aspect of the opacity measures 2.4 x 2.2 cm and appears somewhat bulbous.   There is diffuse mild increased FDG activity identi associated FDG activity. Similar findings are seen on the previous CT from 6/13/2018.           =====  CONCLUSION:    1. Findings are consistent with worsening metastatic disease involving the liver.   Multiple new hepatic masses with abnormal FDG accumul social support systems and currently there are no active problems.     Ekaterina Garcia MD  [address] [facility]

## 2019-04-10 NOTE — PROGRESS NOTES
Pt here for C3.   Arrives Ambulating independently, accompanied by Self           Modifications in dose or schedule: No     Frequency of blood return and site check throughout administration: Prior to administration   Discharged to Home, Ambulating independ

## 2019-05-01 NOTE — PROGRESS NOTES
Cancer Center Progress Note    Patient Name: Salo Hernandez   YOB: 1945   Medical Record Number: [id number]   CSN: [id number]   Attending Physician: Pat Leblanc M.D.    Referring Physician: Sheryl Petersen MD      Date of Visit: 5/1/2019 to oligometastatic liver lesion.      - chemoRT with cisplatin and etoposide 7/9-8/13/18.     -  PET scan done 9/25/18 showed response with resolution of uptake in the RLL and subcarinal node with stable metastatic lesion within the right lobe of liver (JUICE PLUS FIBRE OR), Take by mouth., Disp: , Rfl:   •  aspirin 325 MG Oral Tab, Take 162.5 mg by mouth daily. Takes half a tablet daily. 162.5mg , Disp: , Rfl:   •  Albuterol Sulfate (PROVENTIL HFA IN), Inhale into the lungs 2 (two) times daily. , Disp: , ENDARTERECTOMY  2015   • CAROTID ENDARTERECTOMY Right 3/11/2016    Performed by Emelina Wilson MD at [address] [facility] Left 5/10/2013    Performed by Emelina Wilson MD at . Pck 125 (BMS)      left *carotid* stent on file        Minutes per session: Not on file      Stress: Not on file    Relationships      Social connections:        Talks on phone: Not on file        Gets together: Not on file        Attends Restorationism service: Not on file        Active member of cl 7423)  BP: 105/64 (05/01 0812)  Temp: 98.8 °F (37.1 °C) (05/01 0812)  Do Not Use - Resp Rate: --  SpO2: 95 % (05/01 0812)      Physical Examination:  General: Patient is alert and oriented x 3, not in acute distress.    Psych:  Mood and affect appropriate lung    HISTORY: Left lung cancer    COMPARISON: Chest CT 1/16/2019, 2/23/2018    TECHNIQUE: Axial imaging through the chest, abdomen were obtained. Sagittal and coronal reformatted images were also evaluated. Dose reduction technique was utilized.     INTR measuring 11 mm. Also, possible new low-density lesion in the posterior segment right hepatic lobe (image 36) measuring approximately 15 mm. Also, ill-defined low density lesions appear to be near the hepatic dome thought new compared to previous.     The g 14(8), [id number]. Clinical correlation advised. See above      [employer] Users:     Please note all Radiology and Diagnostic Imaging results are subject to interpretation according to your specific medical history and condition.  The results in this report s lower lobe pulmonary nodule (image 67) is not definitely identified on the current study. Previous new 5 mm left lower lobe pulmonary nodule is smaller now measuring approximately 3 mm (image 65). No definite bony destructive process.     Abdomen:     2 prior history of pancreatitis; clinical correlation is needed in this regard.  In the absence of such a history, the differential diagnosis centers on small cystic pancreatic neoplasm, the majority of which are either benign or have low malignant potential. inflammatory changes in mastoid air cells or visualized paranasal sinuses. Impression:     1. No enhancing mass or areas of pathologic enhancement are noted to suggest metastatic disease.     2. Mild chronic small vessel ischemic changes without acute or lower lobe to the inferior right   hilum. There are air bronchograms identified. Posterior aspect of the opacity measures 2.4 x 2.2 cm and appears somewhat bulbous.   There is diffuse mild increased FDG activity identified throughout this band of airspace are seen on the previous CT from 6/13/2018.           =====  CONCLUSION:    1. Findings are consistent with worsening metastatic disease involving the liver. Multiple new hepatic masses with abnormal FDG accumulation are noted.   2.  Persistent airspace o

## 2019-05-01 NOTE — PROGRESS NOTES
Pt here for C4D1.   Arrives Ambulating independently, accompanied by Self           Modifications in dose or schedule: No Tecentriq infused over 45 minutes per patient's request (subsequent infusions of Tecentriq may be given over 30 minutes)     Frequency

## 2019-05-07 NOTE — PROGRESS NOTES
Education Record    Learner:  Patient    Disease / Kelsey Dawley ca    Barriers / Limitations:  None   Comments:    Method:  Discussion   Comments:    General Topics:  Procedure   Comments:picc line dressing change and flush    Outcome:  Shows understandi

## 2019-05-21 NOTE — PROGRESS NOTES
Patient is seen for an acute visit today. Patient state that he is having pain on his right abdominal side. He is rating his pain 9/10.  Patient will be seen by APN

## 2019-05-22 NOTE — TELEPHONE ENCOUNTER
Called patient he is having a port placed they want him to stop ASA and Clopidogrel  5 days prior to surgery, is that OK? he is 85% clear of cancer so they are doing immune therapy.  He also has pneumonia and is being treated for this

## 2019-05-22 NOTE — TELEPHONE ENCOUNTER
Yes, heart stuff was all in 2016, so ok to hold the ASA and Plavix right now  Resume once able to do so.      I'm not sure if he is still seeing a cardiologist, but if he is, usually they like to know about this as well (just ot make sure)      Thanks

## 2019-05-22 NOTE — PROGRESS NOTES
Cancer Center Progress Note    Patient Name: Thais Stokes   YOB: 1945   Medical Record Number: [id number]   CSN: [id number]   Attending Physician: Rajendra Perez M.D.    Referring Physician: Michiel Ormond, MD      Date of Visit: 5/22/201 chemoRT followed by SBRT to oligometastatic liver lesion.      - chemoRT with cisplatin and etoposide 7/9-8/13/18.     -  PET scan done 9/25/18 showed response with resolution of uptake in the RLL and subcarinal node with stable metastatic lesion within th mouth., Disp: , Rfl:   •  aspirin 325 MG Oral Tab, Take 162.5 mg by mouth daily. Takes half a tablet daily. 162.5mg , Disp: , Rfl:   •  Albuterol Sulfate (PROVENTIL HFA IN), Inhale into the lungs 2 (two) times daily. , Disp: , Rfl:   •  Tiotropium Bromide M ENDARTERECTOMY Right 3/11/2016    Performed by Nancy Osler, MD at [address] Left 5/10/2013    Performed by Nancy Osler, MD at . Pck 125 (B[facility])      left *carotid* stent 02/16   • COLONOSCOPY  2006   • C session: Not on file      Stress: Not on file    Relationships      Social connections:        Talks on phone: Not on file        Gets together: Not on file        Attends Anabaptist service: Not on file        Active member of club or organization: Not on 0803)  Temp: 98.8 °F (37.1 °C) (05/22 0803)  Do Not Use - Resp Rate: --  SpO2: 96 % (05/22 0803)      Physical Examination:  General: Patient is alert and oriented x 3, not in acute distress. Psych:  Mood and affect appropriate  HEENT: EOMs intact.  PERRL 09/17/2013    GLOBULIN 3.9 05/21/2019    ALBGLOBRAT 1.7 09/17/2013     05/21/2019    K 4.3 05/21/2019     05/21/2019    CO2 20.0 (L) 05/21/2019     Lab Component   Component Value Date/Time    CEA 5.0 04/30/2019 1241     Lab Component   Compone metastasis previously had an SUV of 10.9. No definitive new or worsening hepatic metastasis is seen. On series 4, image   207, there may be a 2 cm metastasis within the inferior aspect the right hepatic lobe with a maximal SUV of 6.2.   The previously see deficiency and on supplementation which he says has helped    4. Anemia- from therapy and malignancy. Levels still adequate and will continue to monitor    5. Neuropathy- opted not to try gabapentin.  This is better and hopefully now that he is done with Ta

## 2019-05-22 NOTE — PROGRESS NOTES
Patient will have PICC removed and port placed scheduled for June 11th. Will return June 12th for treatment. Pt here for C1D1 Tecentriq.   Arrives Ambulating independently, accompanied by Self           Modifications in dose or schedule: No - patient ha

## 2019-05-22 NOTE — TELEPHONE ENCOUNTER
Patient having surgery 06/11/19 and would like to speak with a nurse Vibha Luther per patient ) to discuss some issues with his cancer that can be relayed to Dr. Mauricio Bowser

## 2019-05-28 NOTE — PROGRESS NOTES
Education Record    Learner:  Patient    Disease / Diagnosis: PICC line dressing change    Barriers / Limitations:  None   Comments:    Method:  Brief focused and Discussion   Comments:    General Topics:  Plan of care reviewed   Comments:    Outcome:  Anya

## 2019-06-05 NOTE — PROGRESS NOTES
ANP Visit Note    Patient Name: Stephania Stokes   YOB: 1945   Medical Record Number: [id number]   CSN: [id number]   Date of visit: 6/5/2019       Chief Complaint/Reason for Visit:  Patient presents with:  Pain: APN assessment - sick [facility] ([facility]) -  PET scan done 9/25/18 showed response with resolution of uptake in the RLL and subcarinal node with stable metastatic lesion within the right lobe of liver                   - Received stereotactic ablative RT to solitary liver l Former smoker     Nuclear sclerotic cataract of both eyes     History of common carotid artery stent placement     Atherosclerosis of artery of extremity with intermittent claudication ([address]     Hemoptysis     Lung cancer ([address]     Liver lesion       Medic tonsillectomy   • SHOULDER SURG PROC UNLISTED  2010    combined with bicep surgery-might have pins-not certain       Allergies:    Pcns [Penicillins]      UNKNOWN    Comment:childhood  Radiology Contrast *    ANAPHYLAXIS    Comment:States extreme HTN after Relationships      Social connections:        Talks on phone: Not on file        Gets together: Not on file        Attends Tenriism service: Not on file        Active member of club or organization: Not on file        Attends meetings of clubs or [place] Nutritional Supplements (JUICE PLUS FIBRE OR), Take by mouth., Disp: , Rfl:   •  aspirin 325 MG Oral Tab, Take 162.5 mg by mouth daily. Takes half a tablet daily.  162.5mg , Disp: , Rfl:   •  Albuterol Sulfate (PROVENTIL HFA IN), Inhale into the lungs 2 (tw previous visit (from the past 72 hour(s)). Impression/Plan:  1. Metastatic Lung Cancer: recent PET with significant treatment response, On Atezo, last treatment 5/22   2.  Right sided posterior chest/back pain: completed coarse of Levaquin, he remains hy

## 2019-06-06 NOTE — TELEPHONE ENCOUNTER
Reviewed results of CT tat show disease progression, explained will be restarting Chemotherapy (Gemzar) next week. Will start Prednisone tomorrow, 60 mg with taper. He is instructed to call with any worsening of dyspnea with taper.

## 2019-06-11 NOTE — PROGRESS NOTES
S/p Port implant and PICC removal. VSS. Pt denies pain. PICC removed at bedside without difficulty. DC instructions reviewed. appt in cancer center 6-12-19.  Pt dc home in stable condition

## 2019-06-12 NOTE — TELEPHONE ENCOUNTER
Referral request Dr. Denise Alexandre M.D.,Ophthalmology    Patient seen by Dr. Orlin Saez M.D. 7/30/18  Office notes by Dr. Loan Archer M.D. 7/30/18  4.  Type 2 diabetes mellitus with stage 2 chronic kidney disease, without long-term current use of insulin

## 2019-06-12 NOTE — PROGRESS NOTES
Chemotherapy Education    Learner:  Patient    Barriers / Limitations:  None    Chemotherapy education goals:  · Learn the drug names  · Administration schedule  · Routes of administration  · Treatment setting    Drug names:  Gemcitabine    Chemotherapy ac problems:  Achieved    Comments:    Treatment Effects on Emotional Status:    Potential mood changes, depression, nervousness, difficulty sleeping:  Achieved    Importance of support system:  Achieved    Notify MD/RN of any emotional changes:  Achieved

## 2019-06-12 NOTE — PROGRESS NOTES
RESEARCH NOTE    Patient is currently follow up on Biodesix study. Research notified of patient progression and switching treatment to Gemzar. MD declines veristrat blood draw. Patient declined blood draw today for research- visibly upset. Per pt.  re

## 2019-06-12 NOTE — PROGRESS NOTES
Pt here for C 1 D 1 Gemzar.   Arrives Ambulating independently, accompanied by Self           Modifications in dose or schedule: No     Frequency of blood return and site check throughout administration: Prior to administration and At completion of therapy

## 2019-06-12 NOTE — PROGRESS NOTES
Cancer Center Progress Note    Patient Name: Gunnar Rich   YOB: 1945   Medical Record Number: [id number]   CSN: [id number]   Attending Physician: Maureen Borja M.D.    Referring Physician: Mohit Connolly MD      Date of Visit: 6/12/201 with chemoRT followed by SBRT to oligometastatic liver lesion.      - chemoRT with cisplatin and etoposide 7/9-8/13/18.     -  PET scan done 9/25/18 showed response with resolution of uptake in the RLL and subcarinal node with stable metastatic lesion with 5,000 mcg by mouth daily.   , Disp: , Rfl:   •  ROSUVASTATIN CALCIUM 40 MG Oral Tab, TAKE(1/2 ) TABLET BY MOUTH AT BEDTIME, Disp: 15 tablet, Rfl: 5  •  Clopidogrel Bisulfate 75 MG Oral Tab, TAKE 1 TABLET  75 MG TOTAL  BY MOUTH DAILY, Disp: , Rfl: 99  •  SHARIF history of tobacco use, presenting hazards to health    • Prediabetes     Diet Control   • S/P carotid endarterectomy 3/11/2016   • Shortness of breath    • STROKE     TIA 1982 - no residual   • Unspecified sleep apnea 6/29/2012   • Visual impairment     g date: 3/16/2006        Years since quittin.2      Smokeless tobacco: Former User    Substance and Sexual Activity      Alcohol use:  Yes        Alcohol/week: 0.0 - 0.6 oz        Comment: 1 drink a month      Drug use: No        Comment: did speed, smok Premedication seems to alleviate symptoms  Pravastatin             DIARRHEA  Simvastatin             DIARRHEA    Comment:Tabs;     Review of Systems:  A 14-point ROS was done with pertinent positives and negative per the HPI    Vital Signs:  Height: --  We CBC W/ DIFFERENTIAL    Collection Time: 06/12/19  7:46 AM   Result Value Ref Range    WBC 8.8 4.0 - 11.0 x10(3) uL    RBC 3.31 (L) 3.80 - 5.80 x10(6)uL    HGB 11.2 (L) 13.0 - 17.5 g/dL    HCT 33.1 (L) 39.0 - 53.0 %    .0 150.0 - 450.0 10(3)uL    M CONTRAST USED:  75cc of Omnipaque 350     FINDINGS:    LUNGS:  There is right upper lobe, right middle lobe and right lower lobe perihilar peribronchial thickening and soft tissue density opacification.   Compared to the previous outside CT from 4/8/2019, recent PET-CT and new compared to the 4/8/2019 diagnostic CT. THORACIC AORTA:  Mild atheromatous changes within the transverse and descending thoracic aorta and great vessel origins. No evidence for aneurysm or dissection.   CHEST WALL:  Surgical clips ar findings are also worrisome for progressive metastatic disease. Dictated by: Conrado Iyer MD on 6/06/2019 at 8:40       Approved by: Conrado Iyer MD                Impression and Plan:  1. Lung cancer- had rapid progression on IO alone.

## 2019-06-13 NOTE — TELEPHONE ENCOUNTER
Date of Treatment: 6/12/19                                 Chemo: Gemzar    Comments: Spoke with patient. He is feeling more fatigued, napped a few times today. Recommendations: Pt asking about Biodesix samples and agrees to have this done next visit.

## 2019-06-17 NOTE — TELEPHONE ENCOUNTER
DEDE Fernandez Pt calling on status of his medications. States he is all out and upset that he has not received yet.  Requesting a call back once done at [phone]

## 2019-06-19 NOTE — PROGRESS NOTES
STUDY: Biodesix  ID # [id number]  FOLLOW UP     Patient has progression of disease and MD is changing treatment to Gemzar. Patient verbalized understanding of needing blood draw today for research only. Drawn via portacath per Helane Lundborg, infusion RN.  Tubes p

## 2019-06-19 NOTE — PROGRESS NOTES
Cancer Center Progress Note    Patient Name: Jorge Schreiber   YOB: 1945   Medical Record Number: [id number]   CSN: [id number]   Date of visit: 6/19/2019  Provider: JENELLE Guaadrrama  Referring Physician: No ref.  provider found    Proble endobronchial lesion showed a moderate to poorly differentiated NSCLC with squamoid features.  FNA of 11L was negative for malignancy but 7 subcarinal node was positive for metastatic carcinoma.                    - staging w/u done with PET scan and MRI br 1000mg/m2, days 1,8           2. LUE superficial thrombophlebitis from previous IV access diagnosed 9/2018. First treated with NSAIDs, elevation and compresses with worsening symptoms.  Repeat doppler showed thrombophlebitis was worse and was placed on Eliq total) by mouth daily. , Disp: 30 tablet, Rfl: 11  •  ROSUVASTATIN CALCIUM 40 MG Oral Tab, TAKE(1/2 ) TABLET BY MOUTH AT BEDTIME, Disp: 15 tablet, Rfl: 5  •  lidocaine-prilocaine 2.5-2.5 % External Cream, Apply to site 1 hour prior to port a cath needle ins masses or adenopathy  Lungs:  Clear to auscultation on R, L basilar crackles. CV:  Regular rate and rhythm  Abdomen:  Non-distended, normoactive bowel sounds, soft,nontender, no hepatosplenomegaly.   Extremities:  No edema, no tenderness  Neuro:  CN 2-12 i states he's had preexisting symptomatology that did not worsen with day 1 tx. 2.  Hypotension:  500ml NS    3. Dehydration  IV hydration    4. Elevated creatinine:  IVF. Push fluids. 5.  Cough:  L basilar crackles.   He was just put on a course o

## 2019-06-19 NOTE — PROGRESS NOTES
OK to treat per [facility] - patient hypotensive with bp 84 systolic; patient states he is very fatigued and did not sleep much last night. Given 500ml bolus and bp rechecked with systolic coming up to 625. Patient says he feels better.  Patient to return in one

## 2019-06-26 NOTE — PROGRESS NOTES
Education Record    Learner:  Patient    Disease / Jair montoya    Barriers / Limitations:  None   Comments:    Method:  Discussion and Reinforcement   Comments:    General Topics:  Procedure and Plan of care reviewed   Comments:Port lab--off week of

## 2019-06-26 NOTE — PROGRESS NOTES
Patient presents with: Follow - Up: apn assessment    Pt c/o right side pain 4/10 on pain scale sometimes pain is positional and when breathing pain  will increase. Pt also c/o SOB with continued fatigue not sure why he is here today.     Education Record

## 2019-06-26 NOTE — PROGRESS NOTES
Cancer Center Progress Note    Patient Name: Urbano Okeefe   YOB: 1945   Medical Record Number: [id number]   CSN: [id number]   Date of visit: 6/26/2019  Provider: JENELLE De La O  Referring Physician: No ref.  provider found    Proble lesion showed a moderate to poorly differentiated NSCLC with squamoid features. FNA of 11L was negative for malignancy but 7 subcarinal node was positive for metastatic carcinoma.                    - staging w/u done with PET scan and MRI brain.  PET showe 1,8           2. LUE superficial thrombophlebitis from previous IV access diagnosed 9/2018. First treated with NSAIDs, elevation and compresses with worsening symptoms. Repeat doppler showed thrombophlebitis was worse and was placed on Eliquis.  Repeat dopp 0  •  TAMSULOSIN HCL 0.4 MG Oral Cap, TAKE 1 CAPSULE BY MOUTH DAILY, Disp: 30 capsule, Rfl: 5  •  Clopidogrel Bisulfate 75 MG Oral Tab, Take 1 tablet (75 mg total) by mouth daily. , Disp: 30 tablet, Rfl: 11  •  ROSUVASTATIN CALCIUM 40 MG Oral Tab, TAKE(1/2 Anicteric, conjunctivae and sclerae clear, no sinus tenderness, no oropharyngeal lesion/thrush, mucous membranes are moist.  Neck:  Supple, no tenderness, no masses or adenopathy  Lungs:  Clear to auscultation bilaterally  CV:  Regular rate and rhythm  Abd Basophil Absolute 0.01 0.00 - 0.20 x10(3) uL    Immature Granulocyte Absolute 0.04 0.00 - 1.00 x10(3) uL    Neutrophil % 70.0 %    Lymphocyte % 15.3 %    Monocyte % 10.2 %    Eosinophil % 2.0 %    Basophil % 0.5 %    Immature Granulocyte % 2.0 %   SCAN

## 2019-07-03 NOTE — PATIENT INSTRUCTIONS
Carboplatin injection  Brand Name: Paraplatin  What is this medicine? CARBOPLATIN (IZZY keo prieto tin) is a chemotherapy drug. It targets fast dividing cells, like cancer cells, and causes these cells to die.  This medicine is used to treat ovarian cancer a · medicines to increase blood counts like filgrastim, pegfilgrastim, sargramostim  · some antibiotics like amikacin, gentamicin, neomycin, streptomycin, tobramycin  · vaccines  Talk to your doctor or health care professional before taking any of these medi This medicine may increase your risk to bruise or bleed. Call your doctor or health care professional if you notice any unusual bleeding.   Be careful brushing and flossing your teeth or using a toothpick because you may get an infection or bleed more easil · low blood counts - this medicine may decrease the number of white blood cells, red blood cells and platelets. You may be at increased risk for infections and bleeding.   · signs of infection - fever or chills, cough, sore throat, pain or difficulty passin · an unusual or allergic reaction to gemcitabine, other chemotherapy, other medicines, foods, dyes, or preservatives  · pregnant or trying to get pregnant  · breast-feeding  What should I watch for while using this medicine?   Visit your doctor for checks o

## 2019-07-03 NOTE — PROGRESS NOTES
Cancer Center Progress Note    Patient Name: Charlotte Waterman   YOB: 1945   Medical Record Number: [id number]   CSN: [id number]   Attending Physician: Rory Christy M.D.    Referring Physician: Franco Portillo MD      Date of Visit: 7/3/2019 - case was discussed at multidisciplinary thoracic oncology clinic and consensus was to treat chest disease with chemoRT followed by SBRT to oligometastatic liver lesion.      - chemoRT with cisplatin and etoposide 7/9-8/13/18.     -  PET scan done 9/ capsule, Rfl: 5  •  Clopidogrel Bisulfate 75 MG Oral Tab, Take 1 tablet (75 mg total) by mouth daily. , Disp: 30 tablet, Rfl: 11  •  ROSUVASTATIN CALCIUM 40 MG Oral Tab, TAKE(1/2 ) TABLET BY MOUTH AT BEDTIME, Disp: 15 tablet, Rfl: 5  •  l[facility]ocaine[facility]prilocaine [facility] 2/12/2018   • Back problem    • Carotid artery stenosis 2/17/2013   • COPD    • Essential hypertension 12/8/2012   • Essential hypertension    • Hearing impairment     does not wear hearing aids   • High blood pressure    • Spouse name: Not on file      Number of children: Not on file      Years of education: Not on file      Highest education level: Not on file    Occupational History      Occupation: Retired    Social Needs      Financial resource strain: Not on file      F Asked        Back Care: Not Asked        Exercise: Yes          walks daily        Bike Helmet: Not Asked        Seat Belt: Yes        Self-Exams: Not Asked    Social History Narrative      Not on file        Allergies:    Pcns [Penicillins]      UNKNOWN 9.8 06/14/2012    EOS 4.3 06/14/2012    BASO 0.4 06/14/2012    NEPERCENT 58.8 07/03/2019    LYPERCENT 9.5 07/03/2019    MOPERCENT 25.4 07/03/2019    EOPERCENT 1.4 07/03/2019    BAPERCENT 0.5 07/03/2019    NE 2.55 07/03/2019    LYMABS 0.41 (L) 07/03/2019 is short of breath. CONTRAST USED:  75cc of Omnipaque 350     FINDINGS:    LUNGS:  There is right upper lobe, right middle lobe and right lower lobe perihilar peribronchial thickening and soft tissue density opacification.   Compared to the previous ou similar to the previous recent PET-CT and new compared to the 4/8/2019 diagnostic CT. THORACIC AORTA:  Mild atheromatous changes within the transverse and descending thoracic aorta and great vessel origins. No evidence for aneurysm or dissection.   CHEST lower lobe. These findings are also worrisome for progressive metastatic disease. Dictated by: Troy Castañeda MD on 6/06/2019 at 8:40       Approved by: Troy Castañeda MD                Impression and Plan:  1.  Lung cancer- had rapid progre

## 2019-07-03 NOTE — PROGRESS NOTES
Pt here for C2D1 Gemzar/Carbo.   Arrives Ambulating independently, accompanied by Friend           Modifications in dose or schedule: Yes - carboplatin added to regimen     Frequency of blood return and site check throughout administration: Prior to adminis

## 2019-07-07 PROBLEM — Z51.5 PALLIATIVE CARE BY SPECIALIST: Status: ACTIVE | Noted: 2019-01-01

## 2019-07-08 NOTE — PROGRESS NOTES
Palliative Care Consult Note     Patient Name: Kaylyn Weston   YOB: 1945   Medical Record Number: [id number]   CSN: [id number]   Date of visit: 7/7/2019     Reason for Consult:  Symptom management and goals of care    Chief Complaint/Reason • Essential hypertension    • Hearing impairment     does not wear hearing aids   • High blood pressure    • High cholesterol    • History of common carotid artery stent placement 2/12/2018   • History of common carotid artery stent placement 2/12/2018 Comment:Tabs;    Family History:  Family History   Problem Relation Age of Onset   • Cancer Father         prostate cancer   • Cancer Mother         breast cancer   • Other (depression) Brother    • Stroke Paternal Grandfather      Social History:  Social Disp: 1 Tube Rfl: 3   predniSONE 10 MG Oral Tab 60 mg for 4 days, 40 mg for 4 days, 20 mg for 4 days, then 10 mg daily Disp: 66 tablet Rfl: 0   diphenhydrAMINE HCl (BENADRYL ALLERGY) 25 MG Oral Cap Please take 1 hour prior to scheduled scan.  Disp: 2 capsul appropriate    Advanced Directives Discussed and Completed:   HCPOA:   POLST Discussed and Completed:     Palliative Care: discussed using tramadol more frequently to better help pain. This may improve QOL and allow him to do more and sleep better.   He is

## 2019-07-09 NOTE — TELEPHONE ENCOUNTER
Left message with Vera Amanda. Tramadol not helpful for pain. He has liver involvement so will avoid acetaminophen. Can trial dilaudid 2mg Q 4 prn for pain. Asked him to call back to discuss medication and see when he can get script.

## 2019-07-09 NOTE — IMAGING NOTE
Left message with instructions for pretreatment meds/times/doses for contrast allergy for upcoming CT. Also advised to have  as benadryl can cause drowsiness. Call back # provided for any questions.

## 2019-07-09 NOTE — TELEPHONE ENCOUNTER
Pt calling stating he is unable to make appt tomorrow, is in too much pain. DW Dr. Morales Listen and Jovita Fernandez NP. OK to delay chemo 1 week. Pt taking Tramadol 2 tabs every 6 hours instructed, but still in so much pain. Will have Bridget contact pt.  Message sent to [facility]

## 2019-07-12 NOTE — TELEPHONE ENCOUNTER
Patient called today to report pain is gone. He sounds stronger and better today on phone. He is using dilaudid 1/2 tab every 3 hours but he feels sleepy. He denies sedation or confusion but would like not to feel any \"high\".   Instructed him to contin

## 2019-07-17 NOTE — PROGRESS NOTES
Cancer Center Progress Note    Patient Name: Doug Soliz   YOB: 1945   Medical Record Number: [id number]   CSN: [id number]   Attending Physician: Verner Crape, M.D.    Referring Physician: Karson Herrera MD      Date of Visit: 7/17/201 at multidisciplinary thoracic oncology clinic and consensus was to treat chest disease with chemoRT followed by SBRT to oligometastatic liver lesion.      - chemoRT with cisplatin and etoposide 7/9-8/13/18.     -  PET scan done 9/25/18 showed response with is worse. He does not have much of a cough. He has no energy and can't do much. He has been unable to shower.      Current Medications:    Current Outpatient Medications:   •  HYDROmorphone HCl 2 MG Oral Tab, Take 1 tablet (2 mg total) by mouth every 4 (fou Take 2,000 Units by mouth daily. , Disp: , Rfl:   •  Glucosamine-Chondroit-Vit C-Mn (GLUCOSAMINE CHONDR 1500 COMPLX OR), Take 3,000 mg by mouth daily. , Disp: , Rfl:   •  Coenzyme Q10 (CO Q10) 100 MG Oral Cap, Take 200 mg by mouth daily. , Disp: , Rfl:   • Performed by Bennie Mitchell MD at [facility] ENDOSCOPY   • OTHER SURGICAL HISTORY  1970 2003    pilondial cyst resection   • OTHER SURGICAL HISTORY      tonsillectomy   • SHOULDER SURG PROC UNLISTED  2010    combined with bicep surgery-might have pins-not c Intimate partner violence:        Fear of current or ex partner: Not on file        Emotionally abused: Not on file        Physically abused: Not on file        Forced sexual activity: Not on file    Other Topics      Concerns:         Service: Not palpable mass. Extremities: Pedal pulses are present. No BLE edema. Deformity right wrist/hand with exquisite tenderness to palp. Neurological: Grossly intact.        Laboratory:  Lab Results   Component Value Date    WBC 9.8 07/17/2019    RBC 2.58 (L) 07 9:32.     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 Stable scarring and/or atelectasis identified in the left lower lobe and right middle lobe. VASCULATURE:  No visible pulmonary arterial thrombus or attenuation.     BHUMI: are seen throughout the thoracic spine. There is a small 4 mm sclerotic focus along the right side of   the posterior aspect of the T12 vertebral body. The lesion is unchanged from 4/8/2019.   Differential includes sclerotic bony metastatic lesion or bone this is said has not really bothered him as much,     5. Neoplastic related pain- Will give IV pain medications. Palliative care to see today and titrate pain medications. 6. Dehydrated, low BP with elevated creatinine- will hydrate with IVF today.  To

## 2019-07-17 NOTE — PROGRESS NOTES
Patient here for C2D8 of chemotherapy. Chemotherapy already delayed by one week. Patient with BP of 97/58 and HR of 115. Patient states he is very fatigued and in an extreme amount of pain.  Patient states he is getting a decent amount of fluids down and ap

## 2019-07-19 NOTE — TELEPHONE ENCOUNTER
Pt left a message and stated he is in Intensive Care at [facility] with sepsis. He does not know when he will be released. I called the patient back. He did not answer.   I left him a message stating to call on Monday with an update and we will cancel a

## 2019-07-19 NOTE — PROGRESS NOTES
Palliative Care Follow Up Note     Patient Name: Penny Prabhakar   YOB: 1945   Medical Record Number: [id number]   CSN: [id number]   Date of visit: 7/18/2019     Chief Complaint/Reason for Visit:  Palliative care follow up     History of Sunny cholesterol    • History of common carotid artery stent placement 2/12/2018   • History of common carotid artery stent placement 2/12/2018   • Hyperlipidemia 6/29/2012   • Osteoarthrosis, unspecified whether generalized or localized, unspecified site    • Mother         breast cancer   • Other (depression) Brother    • Stroke Paternal Grandfather      Social History:  Social History    Socioeconomic History      Marital status:       Spouse name: Not on file      Number of children: Not on file Disp: 1 Tube Rfl: 3   diphenhydrAMINE HCl (BENADRYL ALLERGY) 25 MG Oral Cap Please take 1 hour prior to scheduled scan. Disp: 2 capsule Rfl: 0   B Complex Vitamins (VITAMIN B-COMPLEX) Oral Tab Take 1 capsule by mouth daily.  Disp:  Rfl:    Vitamin B-12 1000 dosing. He will benefit from a LA opiate. Will add morphine ER. He received some IV dilaudid while in clinic and is feeling slightly better. Goals will need to be discussed when pain is better controlled and imaging completed.       Impression/Plan:

## 2019-07-25 NOTE — TELEPHONE ENCOUNTER
Lady Wharton with [employer] calling to inform Dr. Norma Rivers that patient has been admitted to hospice for metastatic lung cancer

## 2019-08-12 ENCOUNTER — TELEPHONE (OUTPATIENT)
Dept: HEMATOLOGY/ONCOLOGY | Facility: HOSPITAL | Age: 74
End: 2019-08-12

## 2019-08-12 ENCOUNTER — TELEPHONE (OUTPATIENT)
Dept: FAMILY MEDICINE CLINIC | Facility: CLINIC | Age: 74
End: 2019-08-12

## 2019-08-12 NOTE — TELEPHONE ENCOUNTER
Yaz Haley passed away with hospice 8/11  Dr Manan Acosta Pt, please update chart and send condolence letter.  Thanks, Corona Rivas MD

## 2022-07-06 NOTE — PROGRESS NOTES
+1 BLLE, non pitting   Recommend CHILO stockings, elevation at rest   Limited dietary sodium Pt here for C2,D4 Etoposide.   Arrives Ambulating independently, accompanied by Other N/A           Modifications in dose or schedule: No     Frequency of blood return and site check throughout administration: Prior to administration   Discharged to Home, A

## 2023-04-13 NOTE — PROGRESS NOTES
Outpatient Oncology Care Plan  Problem list:  knowledge deficit    Problems related to:    disease/disease progression    Interventions:  provided general teaching    Expected outcomes:  understands plan of care    Progress towards outcome:  resolved    Ed Tazorac Counseling:  Patient advised that medication is irritating and drying.  Patient may need to apply sparingly and wash off after an hour before eventually leaving it on overnight.  The patient verbalized understanding of the proper use and possible adverse effects of tazorac.  All of the patient's questions and concerns were addressed.

## 2024-04-20 NOTE — PROGRESS NOTES
Pt here for atleplase instillation for sluggish PICC line with no blood return. Successful blood return noted after 30 minutes. Pt tolerated well without issue or complaint.  Pt is scheduled for CT scan this afternoon and requested to return tomorrow for CL
Applied

## 2025-04-08 NOTE — PATIENT INSTRUCTIONS
COPD Flare    You have had a flare-up of your COPD. COPD, or chronic obstructive pulmonary disease, is a common lung disease. It causes your airways to become irritated and narrower. This makes it harder for you to breathe.  Emphysema and chronic bronchi · If you were given antibiotics, take them until they are used up or your doctor tells you to stop. It’s important to finish the antibiotics, even though you feel better. This will make sure the infection has cleared.   · If you were given prednisone or ano Date Last Reviewed: 9/1/2016  © 6806-1988 The [address] [place] [address], [address] [place], [place] [address]. All rights reserved. This information is not intended as a substitute for professional medical care.  Always follow your healthcare professional' home

## (undated) DEVICE — ENDOSCOPY PACK UPPER: Brand: MEDLINE INDUSTRIES, INC.

## (undated) DEVICE — 1200CC GUARDIAN II: Brand: GUARDIAN

## (undated) DEVICE — BOWLS UTILITY 16OZ

## (undated) DEVICE — FORCEP RADIAL JAW 1.8/100

## (undated) DEVICE — 60 ML SYRINGE REGULAR TIP: Brand: MONOJECT

## (undated) DEVICE — SYRINGE 10ML SLIP TIP

## (undated) DEVICE — NEEDLE ASP VIZISHOT 22GA 1.8MM

## (undated) DEVICE — Device

## (undated) DEVICE — 3M™ RED DOT™ MONITORING ELECTRODE WITH FOAM TAPE AND STICKY GEL, 50/BAG, 20/CASE, 72/PLT 2570: Brand: RED DOT™

## (undated) DEVICE — NEBULIZER MICRO MIST W/TEE

## (undated) DEVICE — SINGLE USE BIOPSY VALVE MAJ-210: Brand: SINGLE USE BIOPSY VALVE (STERILE)

## (undated) DEVICE — LENS PLASTIC DISP EYEWEAR

## (undated) DEVICE — SINGLE USE SUCTION VALVE MAJ-209: Brand: SINGLE USE SUCTION VALVE (STERILE)

## (undated) DEVICE — FILTERLINE NASAL ADULT O2/CO2

## (undated) DEVICE — GLOVE SURG TRIUMPH SZ 6-1/2

## (undated) DEVICE — MASK ISOLATION

## (undated) DEVICE — LENS FRAMES DISP EYEWEAR

## (undated) DEVICE — MEDI-VAC NON-CONDUCTIVE SUCTION TUBING: Brand: CARDINAL HEALTH

## (undated) DEVICE — MEDI-VAC SUCTION HANDLE REGULAR CAPACITY: Brand: CARDINAL HEALTH

## (undated) NOTE — Clinical Note
Not sure what to make of the pain in the lower legs/feet. ? Too soon for neuropathy? Developed shortly after day 1 but also seems to have pmh of PVD. But the pain occurs at rest and not with walking.

## (undated) NOTE — MR AVS SNAPSHOT
[address] [facility] [address]  [facility],  [address]  [address] [place] [address] [phone]               Thank you for choosing us for your health care visit with Thomas Cosme MD.  We are glad to serve you and happy to provide you with this s Instructions: To schedule an appointment for your radiology test please call Angella Ruby Scheduling at [phone].          Reason for Today's Visit     Cough     Shortness Of Breath     Headache           Medical Issues Discussed Today     C 1 each by Does not apply route 2 (two) times daily. Pseudoephedrine-Codeine-GG 30- MG/5ML Soln   Commonly known as:  MYTUSSIN DAC           Rosuvastatin Calcium 20 MG Tabs   Take 1 tablet (20 mg total) by mouth nightly.    Commonly known as:

## (undated) NOTE — MR AVS SNAPSHOT
[address] [facility] [address]  [facility],  [address]  [address] [place] [address] [phone]               Thank you for choosing us for your health care visit with Anjelica Mccrary MD.  We are glad to serve you and happy to provide you with this s Take 5 mL by mouth every 6 (six) hours as needed for cough. Commonly known as:  CHERATUSSIN AC           lisinopril 20 MG Tabs   TAKE 1 TABLET BY MOUTH ONCE DAILY. Commonly known as:  PRINIVIL,ZESTRIL           ONETOUCH LANCETS Misc   Use as directed. EAT THESE FOODS MORE OFTEN: EAT THESE FOODS LESS OFTEN:   Make half your plate fruits and vegetables Highly refined, white starches including white bread, rice and pasta   Eat plenty of protein, keep the fat content low Sugars:  sodas and sports drinks, ca

## (undated) NOTE — LETTER
[facility] [address], [address]  Consent for Procedure/Sedation    Date:     Time:       1. I authorize the performance upon Charlotte Waterman the following:  VENOUS ACCESS PORT IMPLANT     2.  I authorize  _____________________ ________________________________    ___________________    Witness: _________________________      Date: ___________________    Printed: 2019   1:14 PM  Patient Name: Gregg Mcnulty        : [birth year]       Medical Record #: [id number]

## (undated) NOTE — LETTER
[facility] [address], [address]  Consent for Procedure/Sedation    Date:     Time:       1. I authorize the performance upon Stephania Stokes the following:  VENOUS ACCESS PORT IMPLANT AND PERIPHERALLY INSERTED LINE REMOVAL    2. Signature of person authorized to consent for patient:        Relationship to patient:    ________________________________    ___________________    Witness: _________________________      Date: ___________________    Printed: 5/22/2019   2:59 PM  Patient

## (undated) NOTE — Clinical Note
PATTIE, TCM call made, see notes. NCM attempted to schedule TCM HFU patient states that he has an appointment for a physical at the end of July and does not want to schedule anything sooner.  Message sent to MD's office

## (undated) NOTE — LETTER
Printed: 2019    Patient Name: Gunnar Rich  : 10/11/1945   Medical Record #: [id number]    Consent to Cancer Treatment    I, Gunnar Rich, understand that I have been diagnosed with Metastatic Lung Cancer     I understand that the treatmen treatment at any time. I have had the chance to ask questions about this treatment, and my questions have been answered to my satisfaction.   I understand that I can contact my oncologist, Dr. Doretha Rivera or my Cancer Care Team at any time if I have question

## (undated) NOTE — Clinical Note
Brought him in because he was punk on day 8. Just had him come in to be sure wasn't getting worse. Stable. BP better. Gave him fluids. Plts 55. On Plavix and ASA for PAD and carotid disease.   I did not tell him to hold but if you think too risky, let m

## (undated) NOTE — LETTER
Printed: 2019    Patient Name: Tara Shine  : 10/11/1945   Medical Record #: [id number]    Consent to [facility], understand that I have been diagnosed with Lung Cancer  (stage IV).     I understand that the treatme contact my oncologist, Dr Michelle Cordova or my Cancer Care Team at any time if I have questions, by calling [facility] at [phone]. Additional written information will be given to me prior to start of therapy.     Additionally, I will receive a

## (undated) NOTE — MR AVS SNAPSHOT
[address] [facility] [address]  [facility],  [address]  [address] [place] [address] [phone]               Thank you for choosing us for your health care visit with Leah Landa MD.  We are glad to serve you and happy to provide you with this s Commonly known as:  ONETOUCH VERIO           lisinopril 20 MG Tabs   TAKE 1 TABLET BY MOUTH ONCE DAILY. Commonly known as:  PRINIVIL,ZESTRIL           ONETOUCH LANCETS Misc   Use as directed. Test twice daily.            ONETOUCH VERIO w/Device Kit   1 ea authorization numbers or be assured that none are required. You can then schedule your appointment. Failure to obtain required authorization numbers can create reimbursement difficulties for you. De quervains, wrist pains, trigger thumb.     Assoc Dx

## (undated) NOTE — Clinical Note
[address] [facility], [address] [facility], [address] [place] [address] [address] [address], [facility] [address] [facility] [phone]        Milagro Santiago MD  • Oswald Shahid MD • Kristy Yang MD • DO Shweta Martinez PA-C • DOROTA Iyer